# Patient Record
Sex: FEMALE | Race: WHITE | NOT HISPANIC OR LATINO | Employment: FULL TIME | URBAN - METROPOLITAN AREA
[De-identification: names, ages, dates, MRNs, and addresses within clinical notes are randomized per-mention and may not be internally consistent; named-entity substitution may affect disease eponyms.]

---

## 2020-01-10 ENCOUNTER — LAB (OUTPATIENT)
Dept: LAB | Facility: HOSPITAL | Age: 40
End: 2020-01-10

## 2020-01-10 ENCOUNTER — TRANSCRIBE ORDERS (OUTPATIENT)
Dept: LAB | Facility: HOSPITAL | Age: 40
End: 2020-01-10

## 2020-01-10 DIAGNOSIS — R63.5 ABNORMAL WEIGHT GAIN: Primary | ICD-10-CM

## 2020-01-10 DIAGNOSIS — Z01.419 ROUTINE GYNECOLOGICAL EXAMINATION: ICD-10-CM

## 2020-01-10 DIAGNOSIS — R63.5 ABNORMAL WEIGHT GAIN: ICD-10-CM

## 2020-01-10 LAB
ALBUMIN SERPL-MCNC: 4.5 G/DL (ref 3.5–5.2)
ALBUMIN/GLOB SERPL: 1.6 G/DL
ALP SERPL-CCNC: 60 U/L (ref 39–117)
ALT SERPL W P-5'-P-CCNC: 13 U/L (ref 1–33)
ANION GAP SERPL CALCULATED.3IONS-SCNC: 12.5 MMOL/L (ref 5–15)
AST SERPL-CCNC: 19 U/L (ref 1–32)
BILIRUB SERPL-MCNC: 0.5 MG/DL (ref 0.2–1.2)
BUN BLD-MCNC: 13 MG/DL (ref 6–20)
BUN/CREAT SERPL: 16.9 (ref 7–25)
CALCIUM SPEC-SCNC: 8.9 MG/DL (ref 8.6–10.5)
CHLORIDE SERPL-SCNC: 103 MMOL/L (ref 98–107)
CHOLEST SERPL-MCNC: 160 MG/DL (ref 0–200)
CO2 SERPL-SCNC: 23.5 MMOL/L (ref 22–29)
CREAT BLD-MCNC: 0.77 MG/DL (ref 0.57–1)
DEPRECATED RDW RBC AUTO: 39.7 FL (ref 37–54)
ERYTHROCYTE [DISTWIDTH] IN BLOOD BY AUTOMATED COUNT: 12.1 % (ref 12.3–15.4)
GFR SERPL CREATININE-BSD FRML MDRD: 83 ML/MIN/1.73
GLOBULIN UR ELPH-MCNC: 2.8 GM/DL
GLUCOSE BLD-MCNC: 107 MG/DL (ref 65–99)
HBA1C MFR BLD: 5.57 % (ref 4.8–5.6)
HCT VFR BLD AUTO: 40.4 % (ref 34–46.6)
HDLC SERPL-MCNC: 69 MG/DL (ref 40–60)
HGB BLD-MCNC: 13.4 G/DL (ref 12–15.9)
LDLC SERPL CALC-MCNC: 85 MG/DL (ref 0–100)
LDLC/HDLC SERPL: 1.23 {RATIO}
MCH RBC QN AUTO: 29.6 PG (ref 26.6–33)
MCHC RBC AUTO-ENTMCNC: 33.2 G/DL (ref 31.5–35.7)
MCV RBC AUTO: 89.4 FL (ref 79–97)
PLATELET # BLD AUTO: 326 10*3/MM3 (ref 140–450)
PMV BLD AUTO: 11.6 FL (ref 6–12)
POTASSIUM BLD-SCNC: 4.3 MMOL/L (ref 3.5–5.2)
PROT SERPL-MCNC: 7.3 G/DL (ref 6–8.5)
RBC # BLD AUTO: 4.52 10*6/MM3 (ref 3.77–5.28)
SODIUM BLD-SCNC: 139 MMOL/L (ref 136–145)
TRIGL SERPL-MCNC: 29 MG/DL (ref 0–150)
TSH SERPL DL<=0.05 MIU/L-ACNC: 1.28 UIU/ML (ref 0.27–4.2)
VLDLC SERPL-MCNC: 5.8 MG/DL (ref 5–40)
WBC NRBC COR # BLD: 4.54 10*3/MM3 (ref 3.4–10.8)

## 2020-01-10 PROCEDURE — 84443 ASSAY THYROID STIM HORMONE: CPT

## 2020-01-10 PROCEDURE — 80061 LIPID PANEL: CPT

## 2020-01-10 PROCEDURE — 36415 COLL VENOUS BLD VENIPUNCTURE: CPT

## 2020-01-10 PROCEDURE — 83036 HEMOGLOBIN GLYCOSYLATED A1C: CPT

## 2020-01-10 PROCEDURE — 85027 COMPLETE CBC AUTOMATED: CPT

## 2020-01-10 PROCEDURE — 80053 COMPREHEN METABOLIC PANEL: CPT

## 2020-01-10 PROCEDURE — 83525 ASSAY OF INSULIN: CPT

## 2020-01-14 LAB — INSULIN SERPL-ACNC: 5.6 UIU/ML

## 2022-02-24 ENCOUNTER — OFFICE VISIT (OUTPATIENT)
Dept: BARIATRICS/WEIGHT MGMT | Facility: CLINIC | Age: 42
End: 2022-02-24

## 2022-02-24 VITALS
TEMPERATURE: 98.2 F | HEIGHT: 64 IN | HEART RATE: 79 BPM | WEIGHT: 220.5 LBS | BODY MASS INDEX: 37.65 KG/M2 | DIASTOLIC BLOOD PRESSURE: 66 MMHG | RESPIRATION RATE: 18 BRPM | SYSTOLIC BLOOD PRESSURE: 122 MMHG

## 2022-02-24 DIAGNOSIS — R73.09 ELEVATED HEMOGLOBIN A1C: ICD-10-CM

## 2022-02-24 DIAGNOSIS — E66.9 OBESITY (BMI 30-39.9): Primary | ICD-10-CM

## 2022-02-24 PROBLEM — F50.81 BINGE EATING DISORDER: Status: ACTIVE | Noted: 2022-02-24

## 2022-02-24 PROBLEM — F50.819 BINGE EATING DISORDER: Status: ACTIVE | Noted: 2022-02-24

## 2022-02-24 PROCEDURE — 99205 OFFICE O/P NEW HI 60 MIN: CPT | Performed by: NURSE PRACTITIONER

## 2022-02-24 RX ORDER — MULTIPLE VITAMINS W/ MINERALS TAB 9MG-400MCG
1 TAB ORAL DAILY
COMMUNITY

## 2022-02-24 RX ORDER — LEVOCETIRIZINE DIHYDROCHLORIDE 5 MG/1
5 TABLET, FILM COATED ORAL EVERY EVENING
COMMUNITY

## 2022-02-24 NOTE — PROGRESS NOTES
[facility]  [address]  [place] [address]  [phone]    Name: Deanna Menon  : 1980  Patient Care Team:  Provider, No Known as PCP - General    Chief Complaint   Patient presents with   • Nutrition Counseling   • Consult   • Obesity        HPI:   Ms. Deanna Menon is a 41 y.o. female presents for initiation of medically supervised weight loss. She has tried other methods/programs to lose weight including:  Weight Watchers, Joining a gym and Food journaling and has previously used medication to assist weight loss including:.  Adipex (3 years ago)    Lifetime non-pregnant maximum weight: 220.5  Weight 1 year ago: 180  Weight 5 years ago: 150  The following have contributed to weight gain: over eating    The following seem to sabotage weight loss efforts:enjoyment of food, social events, mindless eating (snacking while working or watching TV), eating too fast, specific cravings like carbohydrates and convenience food (fast food)    Recent Weight History:   Wt Readings from Last 6 Encounters:   22 100 kg (220 lb 8 oz)       Review of Systems:   Review of Systems   Constitutional: Positive for fatigue.        Positive for weight gain   HENT: Negative for trouble swallowing.         Negative for throat swelling   Respiratory: Negative for shortness of breath and wheezing.         Negative for snoring   Cardiovascular: Negative for chest pain, palpitations and leg swelling.   Gastrointestinal: Negative for abdominal pain, constipation, diarrhea, GERD and indigestion.   Endocrine: Negative for cold intolerance, heat intolerance, polydipsia, polyphagia and polyuria.        Negative for loss of hair  Negative for hirsutism     Genitourinary:        Denies menstrual irregularities   Musculoskeletal: Negative for arthralgias.        Denies exercise limitations  Denies chronic pain   Skin: Negative for dry skin.        Negative for acne   Neurological:  "Negative for headache and memory problem.        Negative for paresthesias   Psychiatric/Behavioral: Negative for self-injury, sleep disturbance, suicidal ideas and depressed mood. The patient is not nervous/anxious.        No Known Allergies    Exercise:      Current FITT Score      Frequency   Intensity Time Strength Training   []   0 None  []   0 None  []   0 None  [x]   0 None    []   1 (1-2x/week) []   1 (light) []   1 (<10 min) []   1 (1x/week)   [x]   2 (3-5x/week) [x]   2 (moderate) []   2 (10-20 min) []   2 (2x/week)   []   3 (daily)   []   3 (moderately hard)  []   4 (very hard) [x]   3 (20-30 min)  []   4 (>30 min) []   3 (3-4x/week)       Water: 1 gallon per day oz/day  Alcohol: CAGE is negative   Other beverages:  coffee, sports drinks    PHQ-9 Total Score:  4    VS   Vitals:    02/24/22 0839   BP: 122/66   BP Location: Left arm   Patient Position: Sitting   Cuff Size: Adult   Pulse: 79   Resp: 18   Temp: 98.2 °F (36.8 °C)   TempSrc: Temporal   Weight: 100 kg (220 lb 8 oz)   Height: 162.6 cm (64\")       Start Weight/BMI: 220.5 lb  %fat: 46.2  Total body water (in lbs): 87.0    Measurements (in inches)  Neck: 14  Chest: 45  Waist: 44.4  Hips: 48  Thighs: 46    Physical Exam     ASSESSMENT/PLAN:   Patient viewed educational videos. Topics included obesity as a disease, nutritional education on food groups, exercise, and medications. Patient was instructed in adequate protein, controlled carb and controlled fat intake.   Patient received instructions on using the medicines as a tool in controlling their weight with nutritional and behavioral changes. Risks and benefits were discussed. I believe the potential benefits of medication helping to decrease weight outweighs the risks. Patient is to try nutritonal/behavioral changes only first.   Patient received our clinic education booklet.   Our patient consent form was reviewed including potential risks of weight loss. We also reviewed our confidentiality and " HIPPA statements.     Patient's past medical history was reviewed in detail and barriers to weight loss were identified and discussed. Past efforts at weight reduction on their own as well as under physician supervision were documented and discussed.  I advised patient to continue routine care with their Primary Care Provider.     Nutritional recommendations and goals were reviewed including Calories: 1200 daily adjusted for exercise calories burnt, Protein:35% daily, Net carbs (total carb - fiber) 30% daily.    Diagnoses and all orders for this visit:    1. Obesity (BMI 30-39.9) (Primary)  Assessment & Plan:  Patient's (Body mass index is 37.85 kg/m².) indicates that they are morbidly obese (BMI > 40 or > 35 with obesity - related health condition) with health conditions that include dyslipidemias and pre-diabetic . Weight is newly identified. BMI is is above average; BMI management plan is completed. We discussed low calorie, low carb based diet program, portion control and increasing exercise.   --This is patient's initial visit.  My fitness pal was set up in office based on basal metabolic rate.  She has had experiences tracking and does find it helpful when she does this.  Advised that her #1 goal over the next couple weeks is to restart tracking focusing mostly on her calorie count, protein grams and carb grams.  Asked her to bring in her food journal to next office visit for brief review.   --Fasting labs were completed in office today looking in particular at insulin resistance as she did have a elevated fasting glucose 2 years ago.  She has never been told that she has prediabetes but she currently does not have a primary care provider.  Referral for that sent in.  --Discussed pharmacology.  Patient is not 100% certain if she would prefer pharmacology as an option but we did discuss that the GLP-1 such as Wegovy could be a good option in particular if her labs show that she has a form of insulin resistance.   Prior approval request sent to team to get this started.  Prescription has not been sent in yet and we plan to discuss further at next office visit.    Orders:  -     Urine Drug Screen - Urine, Clean Catch  -     Comprehensive Metabolic Panel  -     Hemoglobin A1c  -     Insulin, Total  -     Lipid Panel  -     T4, Free  -     TSH  -     Vitamin D 25 Hydroxy  -     Ambulatory Referral to Family Practice    2. Elevated hemoglobin A1c  -     Urine Drug Screen - Urine, Clean Catch  -     Comprehensive Metabolic Panel  -     Hemoglobin A1c  -     Insulin, Total  -     Lipid Panel  -     T4, Free  -     TSH  -     Vitamin D 25 Hydroxy  -     Ambulatory Referral to Family Practice       Treatment Plan  Non-Weight Loss Goals: Improved blood glucose: has been addressed., Improved mobility: has been addressed.  and Improved energy: has been addressed.   Initial goal of 5-10% loss of beginning body weight  Goals:  1. Personal Goals: Weight of 160lbs  2. Start changing nutrition to examples given to meet nutritional macronutrient individual ranges discussed. Titrate down 500 calories every 5 days as tolerated until range met. Titrate down 50g carbohydrates every 5 days as tolerated until range met. Inc exercise to the individualized FITT score discussed. Start to keep a food journal and bring into next visit in 2 weeks for review. Practice the behavioral modification technique of mindful eating. Take one MVI daily and 2000mg fish oil daily. Take other medications and supplements as directed.    I spent 75 minutes on this date of service. This time includes time spent by me in the following activities:preparing for the visit, counseling and educating the patient/family/caregiver, ordering medications, tests, or procedures and documenting information in the medical record.    Return in about 2 weeks (around 3/10/2022).      JHONNY Lantigua

## 2022-02-24 NOTE — ASSESSMENT & PLAN NOTE
Patient's (Body mass index is 37.85 kg/m².) indicates that they are morbidly obese (BMI > 40 or > 35 with obesity - related health condition) with health conditions that include dyslipidemias and pre-diabetic . Weight is newly identified. BMI is is above average; BMI management plan is completed. We discussed low calorie, low carb based diet program, portion control and increasing exercise.   --This is patient's initial visit.  My fitness pal was set up in office based on basal metabolic rate.  She has had experiences tracking and does find it helpful when she does this.  Advised that her #1 goal over the next couple weeks is to restart tracking focusing mostly on her calorie count, protein grams and carb grams.  Asked her to bring in her food journal to next office visit for brief review.   --Fasting labs were completed in office today looking in particular at insulin resistance as she did have a elevated fasting glucose 2 years ago.  She has never been told that she has prediabetes but she currently does not have a primary care provider.  Referral for that sent in.  --Discussed pharmacology.  Patient is not 100% certain if she would prefer pharmacology as an option but we did discuss that the GLP-1 such as Wegovy could be a good option in particular if her labs show that she has a form of insulin resistance.  Prior approval request sent to team to get this started.  Prescription has not been sent in yet and we plan to discuss further at next office visit.

## 2022-02-25 ENCOUNTER — PATIENT ROUNDING (BHMG ONLY) (OUTPATIENT)
Dept: BARIATRICS/WEIGHT MGMT | Facility: CLINIC | Age: 42
End: 2022-02-25

## 2022-02-25 NOTE — PROGRESS NOTES
A [employer] message has been sent to the patient for PATIENT ROUNDING for [facility].

## 2022-02-26 LAB
25(OH)D3+25(OH)D2 SERPL-MCNC: 28.1 NG/ML (ref 30–100)
ALBUMIN SERPL-MCNC: 4.3 G/DL (ref 3.8–4.8)
ALBUMIN/GLOB SERPL: 1.9 {RATIO} (ref 1.2–2.2)
ALP SERPL-CCNC: 65 IU/L (ref 44–121)
ALT SERPL-CCNC: 14 IU/L (ref 0–32)
AMPHETAMINES UR QL SCN: NEGATIVE NG/ML
AST SERPL-CCNC: 15 IU/L (ref 0–40)
BARBITURATES UR QL SCN: NEGATIVE NG/ML
BENZODIAZ UR QL SCN: NEGATIVE NG/ML
BILIRUB SERPL-MCNC: 0.5 MG/DL (ref 0–1.2)
BUN SERPL-MCNC: 9 MG/DL (ref 6–24)
BUN/CREAT SERPL: 13 (ref 9–23)
BZE UR QL SCN: NEGATIVE NG/ML
CALCIUM SERPL-MCNC: 8.7 MG/DL (ref 8.7–10.2)
CANNABINOIDS UR QL SCN: NEGATIVE NG/ML
CHLORIDE SERPL-SCNC: 103 MMOL/L (ref 96–106)
CHOLEST SERPL-MCNC: 185 MG/DL (ref 100–199)
CO2 SERPL-SCNC: 23 MMOL/L (ref 20–29)
CREAT SERPL-MCNC: 0.68 MG/DL (ref 0.57–1)
CREAT UR-MCNC: 22.4 MG/DL (ref 20–300)
GLOBULIN SER CALC-MCNC: 2.3 G/DL (ref 1.5–4.5)
GLUCOSE SERPL-MCNC: 107 MG/DL (ref 65–99)
HBA1C MFR BLD: 5.5 % (ref 4.8–5.6)
HDLC SERPL-MCNC: 64 MG/DL
INSULIN SERPL-ACNC: 6.8 UIU/ML (ref 2.6–24.9)
LABORATORY COMMENT REPORT: NORMAL
LDLC SERPL CALC-MCNC: 107 MG/DL (ref 0–99)
METHADONE UR QL SCN: NEGATIVE NG/ML
OPIATES UR QL SCN: NEGATIVE NG/ML
OXYCODONE+OXYMORPHONE UR QL SCN: NEGATIVE NG/ML
PCP UR QL: NEGATIVE NG/ML
PH UR: 7.1 [PH] (ref 4.5–8.9)
POTASSIUM SERPL-SCNC: 4.6 MMOL/L (ref 3.5–5.2)
PROPOXYPH UR QL SCN: NEGATIVE NG/ML
PROT SERPL-MCNC: 6.6 G/DL (ref 6–8.5)
SODIUM SERPL-SCNC: 138 MMOL/L (ref 134–144)
T4 FREE SERPL-MCNC: 0.88 NG/DL (ref 0.82–1.77)
TRIGL SERPL-MCNC: 77 MG/DL (ref 0–149)
TSH SERPL DL<=0.005 MIU/L-ACNC: 1.22 UIU/ML (ref 0.45–4.5)
VLDLC SERPL CALC-MCNC: 14 MG/DL (ref 5–40)

## 2022-02-28 RX ORDER — ERGOCALCIFEROL 1.25 MG/1
50000 CAPSULE ORAL WEEKLY
Qty: 8 CAPSULE | Refills: 0 | Status: SHIPPED | OUTPATIENT
Start: 2022-02-28 | End: 2022-04-29

## 2022-03-09 RX ORDER — SEMAGLUTIDE 0.25 MG/.5ML
0.25 INJECTION, SOLUTION SUBCUTANEOUS WEEKLY
Qty: 2 ML | Refills: 0 | Status: CANCELLED | OUTPATIENT
Start: 2022-03-09 | End: 2022-04-08

## 2022-03-09 NOTE — PROGRESS NOTES
"[facility]  [address]  [place] [address]    Name: Deanna Menon  : 1980    /62 (BP Location: Right arm, Patient Position: Sitting, Cuff Size: Adult)   Pulse 83   Temp 98 °F (36.7 °C) (Temporal)   Resp 18   Ht 162.6 cm (64\")   Wt 94.6 kg (208 lb 8 oz)   SpO2 98%   BMI 35.79 kg/m²     Chief Complaint   Patient presents with   • Obesity   • Follow-up        No Known Allergies    Vitals:    03/10/22 0859   BP: 118/62   BP Location: Right arm   Patient Position: Sitting   Cuff Size: Adult   Pulse: 83   Resp: 18   Temp: 98 °F (36.7 °C)   TempSrc: Temporal   SpO2: 98%   Weight: 94.6 kg (208 lb 8 oz)   Height: 162.6 cm (64\")     Recent Weight History:   Wt Readings from Last 6 Encounters:   03/10/22 94.6 kg (208 lb 8 oz)   22 100 kg (220 lb 8 oz)       Last office visit weight (at [facility] visit) : 220.5  Change in weight since last visit: -12  Start Weight:220.5  Total Loss%: -5.44  Loss of BBW: -12    Body composition analysis completed and showed:  %body fat: 46.1  Total fat mass (in lbs): 96.0  Fat free mass (in lbs): 112.5  Total body water (in lb): 82.5  BMR: 1672     Measurements (in inches)  Waist: 37.5    The patient is exercising with a FITT score of:    Frequency   Intensity Time Strength Training   []   0 None  []   0 None  []   0 None  [x]   0 None    []   1 (1-2x/week) [x]   1 (light) []   1 (<10 min) []   1 (1x/week)   []   2 (3-5x/week) [x]   2 (moderate) []   2 (10-20 min) []   2 (2x/week)   []   3 (daily)   []   3 (moderately hard)  []   4 (very hard) []   3 (20-30 min)  [x]   4 (>30 min) []   3 (3-4x/week)       Office visit for Deanna Menon, a 41 y.o. female, established patient for medical weight management. Patient is satisfied with weight loss progress.    Hunger control has remained unchanged The patient is exercising. The patient is using a food journal. The patient is not checking weight regularly. The patient " rates current efforts as 10 out of 10. Body mass index is 35.79 kg/m². and has not reached treatment goal.     Review of Systems:  Review of Systems   Constitutional: Negative for fatigue.   Eyes: Negative for visual disturbance.   Cardiovascular: Negative for chest pain and palpitations.   Gastrointestinal: Negative for abdominal pain, constipation, diarrhea, nausea and GERD.   Neurological: Negative for dizziness, tremors, light-headedness, headache and memory problem.        Parasthesias negative   Psychiatric/Behavioral: Negative for sleep disturbance and depressed mood. The patient is not nervous/anxious.        Physical Exam:  Physical Exam  Vitals reviewed.   Constitutional:       Appearance: She is obese. She is not ill-appearing.   HENT:      Head:      Comments: masked  Pulmonary:      Effort: Pulmonary effort is normal.   Neurological:      Mental Status: She is alert.   Psychiatric:         Attention and Perception: Attention and perception normal.         Behavior: Behavior is cooperative.          ASSESSMENT/PLAN:   Diagnoses and all orders for this visit:    1. Obesity (BMI 30-39.9) (Primary)  Assessment & Plan:  Patient's (Body mass index is 35.79 kg/m².) indicates that they are morbidly obese (BMI > 40 or > 35 with obesity - related health condition) with health conditions that include dyslipidemias and pre-diabetes . Weight is improving with lifestyle modifications. BMI is is above average; BMI management plan is completed. We discussed low calorie, low carb based diet program, portion control and increasing exercise.     --This is the initial follow-up visit and she is down 12  pounds since last office visit  --She has done an excellent job of food journaling tracking every single day since last office visit and did bring in for a review.  She is staying within her calorie goals almost all days and keeping a very close track on her macros as well.  But this great effort and her #1 goal over this  next month is going to be to continue this and asked her to bring in her food journal to next office visit.  Again emphasized that were not looking for perfection but again just looking to see where her calories are coming from.  --Goal to add 3 non starchy vegetables a day   --Has reached water goal. Keep this great effort  --Been very good with exercise keeping 30 min daily.       2. Elevated hemoglobin A1c  Assessment & Plan:  --Start ozempic. PA started and Rx sent to pharmacy.  Patient advised if she is able to pick this up prior to next office visit to not start but instead bring it into office for injection instructions.  If not covered, consider Rybelsus.     Orders:  -     Semaglutide,0.25 or 0.5MG/DOS, (Ozempic, 0.25 or 0.5 MG/DOSE,) 2 MG/1.5ML solution pen-injector; Inject 0.25 mg under the skin into the appropriate area as directed 1 (One) Time Per Week for 30 days.  Dispense: 1 pen; Refill: 0     I spent 30 minutes on this date of service. This time includes time spent by me in the following activities:preparing for the visit, counseling and educating the patient/family/caregiver, ordering medications, tests, or procedures and documenting information in the medical record.    Plan of care reviewed with the patient at the conclusion of today's visit. We discussed the risks, benefits, and limitations of treatments. Continue medications and OTC supplements as discussed. Patient verbalizes understanding of and agreement with management plan.      Return in about 4 weeks (around 4/7/2022).      JHONNY Lantigua

## 2022-03-10 ENCOUNTER — OFFICE VISIT (OUTPATIENT)
Dept: BARIATRICS/WEIGHT MGMT | Facility: CLINIC | Age: 42
End: 2022-03-10

## 2022-03-10 VITALS
TEMPERATURE: 98 F | BODY MASS INDEX: 35.59 KG/M2 | RESPIRATION RATE: 18 BRPM | HEART RATE: 83 BPM | WEIGHT: 208.5 LBS | OXYGEN SATURATION: 98 % | SYSTOLIC BLOOD PRESSURE: 118 MMHG | HEIGHT: 64 IN | DIASTOLIC BLOOD PRESSURE: 62 MMHG

## 2022-03-10 DIAGNOSIS — E66.9 OBESITY (BMI 30-39.9): Primary | ICD-10-CM

## 2022-03-10 DIAGNOSIS — R73.09 ELEVATED HEMOGLOBIN A1C: ICD-10-CM

## 2022-03-10 PROCEDURE — 99214 OFFICE O/P EST MOD 30 MIN: CPT | Performed by: NURSE PRACTITIONER

## 2022-03-10 RX ORDER — SEMAGLUTIDE 1.34 MG/ML
0.25 INJECTION, SOLUTION SUBCUTANEOUS WEEKLY
Qty: 1 PEN | Refills: 0 | Status: SHIPPED | OUTPATIENT
Start: 2022-03-10 | End: 2022-04-09

## 2022-03-10 NOTE — ASSESSMENT & PLAN NOTE
--Start ozempic. PA started and Rx sent to pharmacy.  Patient advised if she is able to pick this up prior to next office visit to not start but instead bring it into office for injection instructions.  If not covered, consider Rybelsus.

## 2022-03-10 NOTE — ASSESSMENT & PLAN NOTE
Patient's (Body mass index is 35.79 kg/m².) indicates that they are morbidly obese (BMI > 40 or > 35 with obesity - related health condition) with health conditions that include dyslipidemias and pre-diabetes . Weight is improving with lifestyle modifications. BMI is is above average; BMI management plan is completed. We discussed low calorie, low carb based diet program, portion control and increasing exercise.     --This is the initial follow-up visit and she is down 12  pounds since last office visit  --She has done an excellent job of food journaling tracking every single day since last office visit and did bring in for a review.  She is staying within her calorie goals almost all days and keeping a very close track on her macros as well.  But this great effort and her #1 goal over this next month is going to be to continue this and asked her to bring in her food journal to next office visit.  Again emphasized that were not looking for perfection but again just looking to see where her calories are coming from.  --Goal to add 3 non starchy vegetables a day   --Has reached water goal. Keep this great effort  --Been very good with exercise keeping 30 min daily.

## 2022-04-11 NOTE — PROGRESS NOTES
"[facility] [place] [facility]  [address]  [place] [address]    Name: Deanna Menon  : 1980    /68 (BP Location: Right arm, Patient Position: Sitting, Cuff Size: Adult)   Pulse 72   Temp 97.5 °F (36.4 °C) (Temporal)   Resp 18   Ht 162.6 cm (64\")   Wt 89.4 kg (197 lb)   SpO2 98%   BMI 33.81 kg/m²     Chief Complaint   Patient presents with   • Obesity   • Follow-up        No Known Allergies    Vitals:    22 0813   BP: 126/68   BP Location: Right arm   Patient Position: Sitting   Cuff Size: Adult   Pulse: 72   Resp: 18   Temp: 97.5 °F (36.4 °C)   TempSrc: Temporal   SpO2: 98%   Weight: 89.4 kg (197 lb)   Height: 162.6 cm (64\")       Recent Weight History:   Wt Readings from Last 6 Encounters:   22 89.4 kg (197 lb)   03/10/22 94.6 kg (208 lb 8 oz)   22 100 kg (220 lb 8 oz)       Last office visit weight (at [facility] visit) : 208.5  Change in weight since last visit: -11.5  Start Weight: 220.5  Total Loss%: -10.66  Loss of BBW: -23.5    Body composition analysis completed and showed:  %body fat: 44.2  Total fat mass (in lbs): 87  Fat free mass (in lbs): 110  Total body water (in lb): 80.5  BMR: 1622     Measurements (in inches)  Waist: 37    The patient is exercising with a FITT score of:    Frequency   Intensity Time Strength Training   []   0 None  []   0 None  []   0 None  [x]   0 None    []   1 (1-2x/week) [x]   1 (light) []   1 (<10 min) []   1 (1x/week)   [x]   2 (3-5x/week) [x]   2 (moderate) []   2 (10-20 min) []   2 (2x/week)   []   3 (daily)   []   3 (moderately hard)  []   4 (very hard) []   3 (20-30 min)  [x]   4 (>30 min) []   3 (3-4x/week)       Office visit for Deanna Menon, a 41 y.o. female, established patient for medical weight management. Patient is satisfied with weight loss progress.    Hunger control has remained unchanged The patient is exercising. The patient is using a food journal. The patient is not checking " weight regularly. The patient rates current efforts as 10 out of 10. Body mass index is 33.81 kg/m². and has not reached treatment goal.     Review of Systems:  Review of Systems   Constitutional: Negative for fatigue.   Eyes: Negative for visual disturbance.   Cardiovascular: Negative for chest pain and palpitations.   Gastrointestinal: Negative for abdominal pain, constipation, diarrhea, nausea and GERD.   Neurological: Negative for dizziness, tremors, light-headedness, headache and memory problem.        Parasthesias negative   Psychiatric/Behavioral: Negative for sleep disturbance and depressed mood. The patient is not nervous/anxious.        Physical Exam:  Physical Exam  Vitals reviewed.   Constitutional:       Appearance: She is obese. She is not ill-appearing.   HENT:      Head:      Comments: masked  Pulmonary:      Effort: Pulmonary effort is normal.   Neurological:      Mental Status: She is alert.   Psychiatric:         Attention and Perception: Attention and perception normal.         Behavior: Behavior is cooperative.          ASSESSMENT/PLAN:   Diagnoses and all orders for this visit:    1. Obesity (BMI 30-39.9) (Primary)  Assessment & Plan:  Patient's (Body mass index is 33.81 kg/m².) indicates that they are obese (BMI >30) with health conditions that include dyslipidemias and pre-diabetic . Weight is improving with lifestyle modifications. BMI is is above average; BMI management plan is completed. We discussed low calorie, low carb based diet program, portion control and increasing exercise.     --This is a follow up visit and patient is down 11.5 pounds since last office visit.  --She did bring in her food journal and has done an excellent job of food journaling every day this past month.  In particular she has stayed within her macro goals.  Advised to continue this great effort and to bring in to food journal to next office visit.  --She did bring the Ozempic into office today and we discussed  all questions.  She is still deciding if she plans to start this medication but she was given injection instructions.  Zofran sent in to use as needed for nausea if she does decide to start at home.  --Does regular exercise. Keep up great effort.   --Goal of eating 3 non-starchy vegetables. Still working on this habit.       2. Elevated hemoglobin A1c    Other orders  -     ondansetron ODT (ZOFRAN-ODT) 8 MG disintegrating tablet; Place 1 tablet on the tongue Every 8 (Eight) Hours As Needed for Nausea or Vomiting for up to 30 days.  Dispense: 30 tablet; Refill: 1       I spent 40 minutes on this date of service. This time includes time spent by me in the following activities:preparing for the visit, counseling and educating the patient/family/caregiver, ordering medications, tests, or procedures and documenting information in the medical record.    Plan of care reviewed with the patient at the conclusion of today's visit. We discussed the risks, benefits, and limitations of treatments. Continue medications and OTC supplements as discussed. Patient verbalizes understanding of and agreement with management plan.      Return in about 4 weeks (around 5/11/2022).      Aminah Rosario, APRN

## 2022-04-13 ENCOUNTER — OFFICE VISIT (OUTPATIENT)
Dept: BARIATRICS/WEIGHT MGMT | Facility: CLINIC | Age: 42
End: 2022-04-13

## 2022-04-13 VITALS
OXYGEN SATURATION: 98 % | DIASTOLIC BLOOD PRESSURE: 68 MMHG | SYSTOLIC BLOOD PRESSURE: 126 MMHG | TEMPERATURE: 97.5 F | BODY MASS INDEX: 33.63 KG/M2 | HEIGHT: 64 IN | WEIGHT: 197 LBS | RESPIRATION RATE: 18 BRPM | HEART RATE: 72 BPM

## 2022-04-13 DIAGNOSIS — R73.09 ELEVATED HEMOGLOBIN A1C: ICD-10-CM

## 2022-04-13 DIAGNOSIS — E66.9 OBESITY (BMI 30-39.9): Primary | ICD-10-CM

## 2022-04-13 PROCEDURE — 99215 OFFICE O/P EST HI 40 MIN: CPT | Performed by: NURSE PRACTITIONER

## 2022-04-13 RX ORDER — ONDANSETRON 8 MG/1
8 TABLET, ORALLY DISINTEGRATING ORAL EVERY 8 HOURS PRN
Qty: 30 TABLET | Refills: 1 | Status: SHIPPED | OUTPATIENT
Start: 2022-04-13 | End: 2022-05-13

## 2022-04-13 NOTE — ASSESSMENT & PLAN NOTE
Patient's (Body mass index is 33.81 kg/m².) indicates that they are obese (BMI >30) with health conditions that include dyslipidemias and pre-diabetic . Weight is improving with lifestyle modifications. BMI is is above average; BMI management plan is completed. We discussed low calorie, low carb based diet program, portion control and increasing exercise.     --This is a follow up visit and patient is down 11.5 pounds since last office visit.  --She did bring in her food journal and has done an excellent job of food journaling every day this past month.  In particular she has stayed within her macro goals.  Advised to continue this great effort and to bring in to food journal to next office visit.  --She did bring the Ozempic into office today and we discussed all questions.  She is still deciding if she plans to start this medication but she was given injection instructions.  Zofran sent in to use as needed for nausea if she does decide to start at home.  --Does regular exercise. Keep up great effort.   --Goal of eating 3 non-starchy vegetables. Still working on this habit.

## 2022-05-11 NOTE — PROGRESS NOTES
"[facility] [place] [facility]  [address]  [place] [address]    Name: Deanna Menon  : 1980    /64 (BP Location: Right arm, Patient Position: Sitting, Cuff Size: Adult)   Pulse 82   Temp 96.2 °F (35.7 °C) (Temporal)   Resp 18   Ht 162.6 cm (64\")   Wt 85.5 kg (188 lb 8 oz)   SpO2 98%   BMI 32.36 kg/m²     Chief Complaint   Patient presents with   • Obesity   • Follow-up        No Known Allergies    Vitals:    22 0828   BP: 122/64   BP Location: Right arm   Patient Position: Sitting   Cuff Size: Adult   Pulse: 82   Resp: 18   Temp: 96.2 °F (35.7 °C)   TempSrc: Temporal   SpO2: 98%   Weight: 85.5 kg (188 lb 8 oz)   Height: 162.6 cm (64\")       Recent Weight History:   Wt Readings from Last 6 Encounters:   22 85.5 kg (188 lb 8 oz)   22 89.4 kg (197 lb)   03/10/22 94.6 kg (208 lb 8 oz)   22 100 kg (220 lb 8 oz)       Last office visit weight (at [facility] visit) : 197  Change in weight since last visit:-8.5  Start Weight: 220.5  Total Loss%: -14.51  Loss of BBW: -32    Body composition analysis completed and showed:  %body fat: 43.1  Total fat mass (in lbs): 81  Fat free mass (in lbs): 107.5  Total body water (in lb): 78.5  BMR: 1585     Measurements (in inches)  Waist: 34    The patient is exercising with a FITT score of:    Frequency   Intensity Time Strength Training   []   0 None  []   0 None  []   0 None  [x]   0 None    []   1 (1-2x/week) [x]   1 (light) []   1 (<10 min) []   1 (1x/week)   [x]   2 (3-5x/week) [x]   2 (moderate) []   2 (10-20 min) []   2 (2x/week)   []   3 (daily)   []   3 (moderately hard)  []   4 (very hard) []   3 (20-30 min)  [x]   4 (>30 min) []   3 (3-4x/week)       Office visit for Deanna Menon, a 41 y.o. female, established patient for medical weight management. Patient is satisfied with weight loss progress.    Hunger control has improved The patient is exercising. The patient is using a food journal. The " patient is not checking weight regularly. The patient rates current efforts as 10 out of 10. Body mass index is 32.36 kg/m². and has not reached treatment goal.     Review of Systems:  Review of Systems   Constitutional: Negative for fatigue.   Eyes: Negative for visual disturbance.   Cardiovascular: Negative for chest pain and palpitations.   Gastrointestinal: Negative for abdominal pain, constipation, diarrhea, nausea and GERD.   Neurological: Negative for dizziness, tremors, light-headedness, headache and memory problem.        Parasthesias negative   Psychiatric/Behavioral: Negative for sleep disturbance and depressed mood. The patient is not nervous/anxious.        Physical Exam:  Physical Exam  Vitals reviewed.   Constitutional:       Appearance: She is obese. She is not ill-appearing.   HENT:      Head:      Comments: masked  Pulmonary:      Effort: Pulmonary effort is normal.   Neurological:      Mental Status: She is alert.   Psychiatric:         Attention and Perception: Attention and perception normal.         Behavior: Behavior is cooperative.          ASSESSMENT/PLAN:   Diagnoses and all orders for this visit:    1. Obesity (BMI 30-39.9) (Primary)  Assessment & Plan:  Patient's (Body mass index is 32.36 kg/m².) indicates that they are obese (BMI >30) with health conditions that include dyslipidemias and pre-diabetic . Weight is improving with treatment. BMI is is above average; BMI management plan is completed. We discussed low calorie, low carb based diet program, portion control and increasing exercise.     -- Patient is here for a follow-up visit and she is down 8.5 pounds since last office visit.  -- She states that she is continuing to do her food journal and is journaling most days getting into a regular rhythm of food. She did bring in her   -- She did start the Ozempic around 3 weeks ago and has tolerated well with very minimal side effects with only very mild nausea not needing any medication  for this.  She just noticed slight constipation and started MiraLAX.  Advised to continue this on a regular basis as a constipation prevention.  Advised that MiraLAX does not work well after you are already constipated but works best for prevention.  -- Discussed it is okay to increase Ozempic to 0.25 after 4 full weeks at 0.25.  Prescription sent in.    Orders:  -     Semaglutide,0.25 or 0.5MG/DOS, (OZEMPIC) 2 MG/1.5ML solution pen-injector; Inject 0.5 mg under the skin into the appropriate area as directed 1 (One) Time Per Week for 30 days.  Dispense: 1 pen; Refill: 1    2. Elevated hemoglobin A1c  -     Semaglutide,0.25 or 0.5MG/DOS, (OZEMPIC) 2 MG/1.5ML solution pen-injector; Inject 0.5 mg under the skin into the appropriate area as directed 1 (One) Time Per Week for 30 days.  Dispense: 1 pen; Refill: 1       I spent 30 minutes on this date of service. This time includes time spent by me in the following activities:preparing for the visit, counseling and educating the patient/family/caregiver, ordering medications, tests, or procedures and documenting information in the medical record.    Plan of care reviewed with the patient at the conclusion of today's visit. We discussed the risks, benefits, and limitations of treatments. Continue medications and OTC supplements as discussed. Patient verbalizes understanding of and agreement with management plan.      Return in about 4 weeks (around 6/9/2022).      JHONNY Lantigua

## 2022-05-12 ENCOUNTER — OFFICE VISIT (OUTPATIENT)
Dept: BARIATRICS/WEIGHT MGMT | Facility: CLINIC | Age: 42
End: 2022-05-12

## 2022-05-12 VITALS
SYSTOLIC BLOOD PRESSURE: 122 MMHG | OXYGEN SATURATION: 98 % | DIASTOLIC BLOOD PRESSURE: 64 MMHG | BODY MASS INDEX: 32.18 KG/M2 | HEIGHT: 64 IN | RESPIRATION RATE: 18 BRPM | WEIGHT: 188.5 LBS | TEMPERATURE: 96.2 F | HEART RATE: 82 BPM

## 2022-05-12 DIAGNOSIS — E66.9 OBESITY (BMI 30-39.9): Primary | ICD-10-CM

## 2022-05-12 DIAGNOSIS — R73.09 ELEVATED HEMOGLOBIN A1C: ICD-10-CM

## 2022-05-12 PROCEDURE — 99214 OFFICE O/P EST MOD 30 MIN: CPT | Performed by: NURSE PRACTITIONER

## 2022-05-12 NOTE — ASSESSMENT & PLAN NOTE
Patient's (Body mass index is 32.36 kg/m².) indicates that they are obese (BMI >30) with health conditions that include dyslipidemias and pre-diabetic . Weight is improving with treatment. BMI is is above average; BMI management plan is completed. We discussed low calorie, low carb based diet program, portion control and increasing exercise.     -- Patient is here for a follow-up visit and she is down 8.5 pounds since last office visit.  -- She states that she is continuing to do her food journal and is journaling most days getting into a regular rhythm of food. She did bring in her   -- She did start the Ozempic around 3 weeks ago and has tolerated well with very minimal side effects with only very mild nausea not needing any medication for this.  She just noticed slight constipation and started MiraLAX.  Advised to continue this on a regular basis as a constipation prevention.  Advised that MiraLAX does not work well after you are already constipated but works best for prevention.  -- Discussed it is okay to increase Ozempic to 0.25 after 4 full weeks at 0.25.  Prescription sent in.

## 2022-07-07 ENCOUNTER — TELEPHONE (OUTPATIENT)
Dept: BARIATRICS/WEIGHT MGMT | Facility: CLINIC | Age: 42
End: 2022-07-07

## 2022-07-07 ENCOUNTER — OFFICE VISIT (OUTPATIENT)
Dept: BARIATRICS/WEIGHT MGMT | Facility: CLINIC | Age: 42
End: 2022-07-07

## 2022-07-07 VITALS
WEIGHT: 173 LBS | OXYGEN SATURATION: 98 % | SYSTOLIC BLOOD PRESSURE: 110 MMHG | HEART RATE: 71 BPM | DIASTOLIC BLOOD PRESSURE: 78 MMHG | TEMPERATURE: 96.9 F | RESPIRATION RATE: 18 BRPM | BODY MASS INDEX: 29.7 KG/M2

## 2022-07-07 DIAGNOSIS — E66.3 OVERWEIGHT (BMI 25.0-29.9): Primary | ICD-10-CM

## 2022-07-07 DIAGNOSIS — R73.09 ELEVATED HEMOGLOBIN A1C: ICD-10-CM

## 2022-07-07 DIAGNOSIS — E66.3 OVERWEIGHT (BMI 25.0-29.9): ICD-10-CM

## 2022-07-07 PROBLEM — Z86.39 HISTORY OF OBESITY: Status: ACTIVE | Noted: 2022-07-07

## 2022-07-07 PROCEDURE — 99215 OFFICE O/P EST HI 40 MIN: CPT | Performed by: NURSE PRACTITIONER

## 2022-07-07 NOTE — ASSESSMENT & PLAN NOTE
Patient's (Body mass index is 29.7 kg/m².) indicates that they are overweight with health conditions that include dyslipidemias and pre-diabetic . Weight is improving with treatment. BMI is is above average; BMI management plan is completed. We discussed low calorie, low carb based diet program, portion control and increasing exercise.   -- This is a follow-up visit and patient is down 15 pounds since last seen around 7 weeks ago  -- Currently taking Ozempic 0.5 held at this dose and tolerating well.  Using MiraLAX regularly as a preventative for constipation and denies other side effects.  -- Going over body comp analysis and adjusting calorie/macro goals as appropriate.  -- At next office visit we will look at an A1c.

## 2022-07-07 NOTE — TELEPHONE ENCOUNTER
[facility] in [place] called to get an adjustment on the prescription of Ozempic Pen.    Insurance requires a 90 day supply, Pharmacist asked could you resubmit a prescription to them for 3 pens with 0 refills     Pharmacy callback # [phone]

## 2022-07-07 NOTE — PROGRESS NOTES
[facility] [place] [facility]  [address]  [place] [address]    Name: Deanna Menon  : 1980    /78   Pulse 71   Temp 96.9 °F (36.1 °C)   Resp 18   Wt 78.5 kg (173 lb)   SpO2 98%   BMI 29.70 kg/m²     No chief complaint on file.       No Known Allergies    Vitals:    22 0832   BP: 110/78   Pulse: 71   Resp: 18   Temp: 96.9 °F (36.1 °C)   SpO2: 98%   Weight: 78.5 kg (173 lb)       Recent Weight History:   Wt Readings from Last 6 Encounters:   22 78.5 kg (173 lb)   22 85.5 kg (188 lb 8 oz)   22 89.4 kg (197 lb)   03/10/22 94.6 kg (208 lb 8 oz)   22 100 kg (220 lb 8 oz)       Last office visit weight (at [facility] visit) : 188.5  Change in weight since last visit: -15  Start Weight: 220.5  Loss of BBW: 47.5    Body composition analysis completed and showed:  %body fat: 39.5    Measurements (in inches)  Waist: 37in    Office visit for Deanna Menon, a 41 y.o. female, established patient for medical weight management. Patient is satisfied with weight loss progress.    Hunger control has improved The patient is exercising. The patient is using a food journal. The patient is checking weight regularly. The patient rates current efforts as 9 out of 10. Body mass index is 29.7 kg/m². and has not reached treatment goal.     Review of Systems:  Review of Systems   Constitutional: Negative for fatigue.   Eyes: Negative for visual disturbance.   Cardiovascular: Negative for chest pain and palpitations.   Gastrointestinal: Negative for abdominal pain, constipation, diarrhea, nausea and GERD.   Neurological: Negative for dizziness, tremors, light-headedness, headache and memory problem.        Parasthesias negative   Psychiatric/Behavioral: Negative for sleep disturbance and depressed mood. The patient is not nervous/anxious.        Physical Exam:  Physical Exam  Vitals reviewed.   Constitutional:       Appearance: She is well-developed.       Comments: overweight   HENT:      Head:      Comments: masked  Neck:      Thyroid: No thyroid mass or thyroid tenderness.   Pulmonary:      Effort: Pulmonary effort is normal.   Neurological:      Mental Status: She is alert.   Psychiatric:         Attention and Perception: Attention normal.         Mood and Affect: Mood normal.         Speech: Speech normal.         Behavior: Behavior normal.          Diagnoses and all orders for this visit:    1. Overweight (BMI 25.0-29.9) (Primary)  Assessment & Plan:  Patient's (Body mass index is 29.7 kg/m².) indicates that they are overweight with health conditions that include dyslipidemias and pre-diabetic . Weight is improving with treatment. BMI is is above average; BMI management plan is completed. We discussed low calorie, low carb based diet program, portion control and increasing exercise.   -- This is a follow-up visit and patient is down 15 pounds since last seen around 7 weeks ago  -- Currently taking Ozempic 0.5 held at this dose and tolerating well.  Using MiraLAX regularly as a preventative for constipation and denies other side effects.  -- Going over body comp analysis and adjusting calorie/macro goals as appropriate.  -- At next office visit we will look at an A1c.    Orders:  -     Semaglutide,0.25 or 0.5MG/DOS, (OZEMPIC) 2 MG/1.5ML solution pen-injector; Inject 0.5 mg under the skin into the appropriate area as directed 1 (One) Time Per Week for 30 days.  Dispense: 1 pen; Refill: 1    2. Elevated hemoglobin A1c  -     Semaglutide,0.25 or 0.5MG/DOS, (OZEMPIC) 2 MG/1.5ML solution pen-injector; Inject 0.5 mg under the skin into the appropriate area as directed 1 (One) Time Per Week for 30 days.  Dispense: 1 pen; Refill: 1     I spent 40 minutes on this date of service. This time includes time spent by me in the following activities:preparing for the visit, counseling and educating the patient/family/caregiver, ordering medications, tests, or procedures and  documenting information in the medical record.    Plan of care reviewed with the patient at the conclusion of today's visit. We discussed the risks, benefits, and limitations of treatments. Continue medications and OTC supplements as discussed. Patient verbalizes understanding of and agreement with management plan.      Return in about 4 weeks (around 8/4/2022).      JHONNY Lantigua

## 2022-08-07 NOTE — PROGRESS NOTES
"[facility] [place] [facility]  [address]  [place] [address]    Name: Deanna Menon  : 1980    /80   Pulse 79   Ht 162.6 cm (64\")   Wt 76 kg (167 lb 8 oz)   LMP 2022   SpO2 99%   BMI 28.75 kg/m²     Chief Complaint   Patient presents with   • Follow-up        No Known Allergies    Vitals:    22 08   BP: 124/80   Pulse: 79   SpO2: 99%   Weight: 76 kg (167 lb 8 oz)   Height: 162.6 cm (64\")       Recent Weight History:   Wt Readings from Last 6 Encounters:   22 76 kg (167 lb 8 oz)   22 78.5 kg (173 lb)   22 85.5 kg (188 lb 8 oz)   22 89.4 kg (197 lb)   03/10/22 94.6 kg (208 lb 8 oz)   22 100 kg (220 lb 8 oz)     The patient is exercising with a FITT score of:    Frequency   Intensity Time Strength Training   []   0 None  []   0 None  []   0 None  [x]   0 None    []   1 (1-2x/week) [x]   1 (light) []   1 (<10 min) []   1 (1x/week)   [x]   2 (3-5x/week) []   2 (moderate) []   2 (10-20 min) []   2 (2x/week)   []   3 (daily)   []   3 (moderately hard)  []   4 (very hard) []   3 (20-30 min)  [x]   4 (>30 min) []   3 (3-4x/week)       Office visit for Deanna Menon, a 41 y.o. female, established patient for medical weight management. Patient is satisfied with weight loss progress.    Hunger control has remained unchanged The patient is exercising. The patient is using a food journal. The patient is not checking weight regularly. The patient rates current efforts as 10 out of 10. Body mass index is 28.75 kg/m². and has not reached treatment goal.     Review of Systems:  Review of Systems   Constitutional: Negative for appetite change and fatigue.   Eyes: Negative for blurred vision and visual disturbance.   Cardiovascular: Negative for chest pain and palpitations.   Gastrointestinal: Negative for abdominal pain, constipation, diarrhea, nausea and GERD.   Endocrine: Negative for polydipsia, polyphagia and polyuria. "   Musculoskeletal: Negative for arthralgias and myalgias.   Neurological: Negative for dizziness, tremors, light-headedness, headache and memory problem.        Parasthesias negative   Psychiatric/Behavioral: Negative for sleep disturbance and depressed mood. The patient is not nervous/anxious.        Physical Exam:  Physical Exam  Vitals reviewed.   Constitutional:       Appearance: She is well-developed.      Comments: overweight   HENT:      Head:      Comments: masked  Neck:      Thyroid: No thyroid mass, thyromegaly or thyroid tenderness.   Pulmonary:      Effort: Pulmonary effort is normal.   Neurological:      Mental Status: She is alert.   Psychiatric:         Attention and Perception: Attention normal.         Mood and Affect: Mood normal.         Speech: Speech normal.         Behavior: Behavior normal.        I spent 30 minutes on this date of service. This time includes time spent by me in the following activities:preparing for the visit, counseling and educating the patient/family/caregiver, ordering medications, tests, or procedures and documenting information in the medical record.    Plan of care reviewed with the patient at the conclusion of today's visit. We discussed the risks, benefits, and limitations of treatments. Continue medications and OTC supplements as discussed. Patient verbalizes understanding of and agreement with management plan.      Return in about 4 weeks (around 9/6/2022).      JHONNY Lantigua

## 2022-08-09 ENCOUNTER — OFFICE VISIT (OUTPATIENT)
Dept: BARIATRICS/WEIGHT MGMT | Facility: CLINIC | Age: 42
End: 2022-08-09

## 2022-08-09 VITALS
OXYGEN SATURATION: 99 % | WEIGHT: 167.5 LBS | HEIGHT: 64 IN | DIASTOLIC BLOOD PRESSURE: 80 MMHG | SYSTOLIC BLOOD PRESSURE: 124 MMHG | BODY MASS INDEX: 28.6 KG/M2 | HEART RATE: 79 BPM

## 2022-08-09 DIAGNOSIS — E66.3 OVERWEIGHT (BMI 25.0-29.9): Primary | ICD-10-CM

## 2022-08-09 DIAGNOSIS — R73.09 ELEVATED HEMOGLOBIN A1C: ICD-10-CM

## 2022-08-09 LAB — HBA1C MFR BLD: 5.2 % (ref 4.8–5.6)

## 2022-08-09 PROCEDURE — 99215 OFFICE O/P EST HI 40 MIN: CPT | Performed by: NURSE PRACTITIONER

## 2022-08-09 NOTE — ASSESSMENT & PLAN NOTE
Patient's (Body mass index is 28.75 kg/m².) indicates that they are overweight with health conditions that include dyslipidemias and pre-diabetic  . Weight is improving with treatment. BMI is is above average; BMI management plan is completed. We discussed low calorie, low carb based diet program, portion control and an kirti-based approach such as [employer] Pal or Lose It.     --This is a follow up visit and she is down 5.5 lb since last visit  --She is doing an excellent job with food journaling and did bring in for a brief review.  Keep up this great effort.  --She is down a total of 53 pounds since the start 24%.  --Goal to increase vegetables this month. Has been increasing fresh non-processed fruits.   --Continue Ozempic 0.5 (held at this dose)   --A1C drawn today.

## 2022-08-16 NOTE — PROGRESS NOTES
[facility]  [address]  [place] [address]     Office Note      Date: 2022  Patient Name: Deanna Menon  MRN: [id number]  : 1980  Subjective  Subjective     Chief Complaint  Obesity Management follow-up          Deanna Menon presents to [facility] for obesity management.   Patient is satisfied with weight loss progress. Appetite is well controlled.   The patient is exercising with a FITT score of:    Frequency Intensity Time Strength Training   []   0, none []   0 []   0 [x]   0   [x]   1 (1-2x/week) [x]   1 (light) []   1 (<10 min) []   1 (1x/week)   []   2 (3-5x/week) []   2 (moderate) []   2 (10-20 min) []   2 (2x/week)   []   3 (daily) []   3 (moderately hard)  []   4 (very hard) []   3 (20-30 min)  [x]   4 (>30 min) []   3 (3-4x/week)     Review of Systems   Constitutional: Negative for appetite change and fatigue.   Eyes: Negative for blurred vision and visual disturbance.   Cardiovascular: Negative for chest pain and palpitations.   Gastrointestinal: Negative for abdominal pain, constipation, diarrhea, nausea and GERD.   Endocrine: Negative for polydipsia, polyphagia and polyuria.   Musculoskeletal: Negative for arthralgias and myalgias.   Neurological: Negative for dizziness, tremors, light-headedness, headache and memory problem.        Parasthesias negative   Psychiatric/Behavioral: Negative for sleep disturbance and depressed mood. The patient is not nervous/anxious.        Objective   Last office visit weight (at [facility] visit) : 173  Change in weight since last visit: -5.5  Start Weight: 220.5  Total Loss%: -24.4  Loss of BBW: -53    Recent Weight History:   Wt Readings from Last 6 Encounters:   22 76 kg (167 lb 8 oz)   22 78.5 kg (173 lb)   22 85.5 kg (188 lb 8 oz)   22 89.4 kg (197 lb)   03/10/22 94.6 kg (208 lb 8 oz)   22 100 kg (220 lb 8 oz)       Body mass index is 28.75  "kg/m².     Body composition analysis completed and showed:  %body fat: 64.1    Total fat mass (in lbs): 38.2  Fat free mass (in lbs): 64.01  Total body water (in lb): 76.01  BMR: 6228     Measurements (in inches)  Waist: 34.25    Vital Signs:   /80   Pulse 79   Ht 162.6 cm (64\")   Wt 76 kg (167 lb 8 oz)   SpO2 99%   BMI 28.75 kg/m²     Physical Exam  Vitals reviewed.   Constitutional:       Appearance: She is obese. She is not ill-appearing.   HENT:      Head:      Comments: masked  Pulmonary:      Effort: Pulmonary effort is normal.   Neurological:      Mental Status: She is alert.   Psychiatric:         Attention and Perception: Attention and perception normal.         Behavior: Behavior is cooperative.               Assessment / Plan        Diagnoses and all orders for this visit:    1. Overweight (BMI 25.0-29.9) (Primary)  Assessment & Plan:  Patient's (Body mass index is 28.75 kg/m².) indicates that they are overweight with health conditions that include dyslipidemias and pre-diabetic  . Weight is improving with treatment. BMI is is above average; BMI management plan is completed. We discussed low calorie, low carb based diet program, portion control and an kirti-based approach such as [employer] Pal or Lose It.     --This is a follow up visit and she is down 5.5 lb since last visit  --She is doing an excellent job with food journaling and did bring in for a brief review.  Keep up this great effort.  --She is down a total of 53 pounds since the start 24%.  --Goal to increase vegetables this month. Has been increasing fresh non-processed fruits.   --Continue Ozempic 0.5 (held at this dose)   --A1C drawn today.     Orders:  -     Hemoglobin A1c    2. Elevated hemoglobin A1c  Assessment & Plan:  Controlled on ozempic 0.5mg. A1C today.     Orders:  -     Hemoglobin A1c    We discussed the risks, benefits, and limitations of treatments. Continue medications and OTC supplements as discussed. Patient " verbalizes understanding of and agreement with management plan.     Follow Up   Return in about 4 weeks (around 9/6/2022).  Patient was given instructions and counseling regarding her condition or for health maintenance advice. Please see specific information pulled into the AVS if appropriate.     I spent 40 minutes on this date of service. This time includes time spent by me in the following activities:preparing for the visit, counseling and educating the patient/family/caregiver, ordering medications, tests, or procedures and documenting information in the medical record.    Aminah Rosario, JHONNY  08/09/2022

## 2022-09-12 NOTE — PROGRESS NOTES
"  [facility]  [address]  [place] [address]     Office Note      Date: 2022  Patient Name: Deanna Menon  MRN: [id number]  : 1980  Subjective  Subjective   --A1C  With in normal limits 5.2  --ozempic held at 0.5    Chief Complaint  Obesity Management follow-up     {Problem List  Visit Diagnosis   Encounters  Notes  Medications  Labs  Result Review Imaging  Media :23}     Deanna Menon presents to [facility] for obesity management.   Patient is {Satisfied/unsatisfied/unsure:53999} with weight loss progress. Appetite is {poor/mod/well:43394}. Reports no side effects of prescribed medications today. The patient {is / IS NOT:08920::\"is not\"} taking multivitamin and {is / IS NOT:87746::\"is not\"} taking fish oil.  The patient {is / IS NOT:47384::\"is not\"} using a food journal.  The patient rates current efforts as *** out of 10.  The patient is exercising with a FITT score of:    Frequency Intensity Time Strength Training   []   0, none []   0 []   0 []   0   []   1 (1-2x/week) []   1 (light) []   1 (<10 min) []   1 (1x/week)   []   2 (3-5x/week) []   2 (moderate) []   2 (10-20 min) []   2 (2x/week)   []   3 (daily) []   3 (moderately hard)  []   4 (very hard) []   3 (20-30 min)  []   4 (>30 min) []   3 (3-4x/week)     Review of Systems    Objective   Start weight: *** pounds.    Total Loss lb/%Loss of beginning body weight (BBW): ***lb/***%  Change in weight since last visit: ***    Recent Weight History:   Wt Readings from Last 6 Encounters:   22 76 kg (167 lb 8 oz)   22 78.5 kg (173 lb)   22 85.5 kg (188 lb 8 oz)   22 89.4 kg (197 lb)   03/10/22 94.6 kg (208 lb 8 oz)   22 100 kg (220 lb 8 oz)       There is no height or weight on file to calculate BMI.   Body composition analysis completed and showed:   %body fat: ***  Measurements (in inches)  Waist: ***    Vital Signs:   There were no " vitals taken for this visit.    Physical Exam   Result Review :{Labs  Result Review  Imaging  Med Tab  Media :23}   The following data was reviewed by: JHONNY Lantigua on 09/13/2022:               Assessment / Plan     {CC Problem List  Visit Diagnosis  ROS  Review (Popup)  [facility] Maintenance  Quality  BestPractice  Medications  SmartSets  SnapShot Encounters  Media :23}   There are no diagnoses linked to this encounter.      We discussed the risks, benefits, and limitations of treatments. Continue medications and OTC supplements as discussed. Patient verbalizes understanding of and agreement with management plan.     Follow Up {Instructions Charge Capture  Follow-up Communications :23}  No follow-ups on file.  Patient was given instructions and counseling regarding her condition or for health maintenance advice. Please see specific information pulled into the AVS if appropriate.     I spent 30 minutes on this date of service. This time includes time spent by me in the following activities:preparing for the visit, counseling and educating the patient/family/caregiver, ordering medications, tests, or procedures and documenting information in the medical record.    JHONNY Lantigua  09/13/2022

## 2022-09-13 ENCOUNTER — TELEMEDICINE (OUTPATIENT)
Dept: BARIATRICS/WEIGHT MGMT | Facility: CLINIC | Age: 42
End: 2022-09-13

## 2022-09-13 VITALS — HEIGHT: 64 IN | BODY MASS INDEX: 27.45 KG/M2 | WEIGHT: 160.8 LBS

## 2022-09-13 DIAGNOSIS — E66.3 OVERWEIGHT (BMI 25.0-29.9): Primary | ICD-10-CM

## 2022-09-13 DIAGNOSIS — R73.09 ELEVATED HEMOGLOBIN A1C: ICD-10-CM

## 2022-09-13 PROCEDURE — 99214 OFFICE O/P EST MOD 30 MIN: CPT | Performed by: NURSE PRACTITIONER

## 2022-09-13 NOTE — ASSESSMENT & PLAN NOTE
Patient's (Body mass index is 27.6 kg/m².) indicates that they are overweight with health conditions that include dyslipidemias and pre-diabetic . Weight is improving with treatment. BMI is is above average; BMI management plan is completed. We discussed low calorie, low carb based diet program, portion control, increasing exercise and an kirti-based approach such as [employer] Pal or Lose It.     --Continue Ozempic 0.5  --Has been working on increasing fruit and vegetable intake. Continue this process  --Continue walking

## 2022-09-13 NOTE — PROGRESS NOTES
[facility]   [address]  [place] [address]  [phone]    Name: Deanna Menon  : 1980  Patient Care Team:  Provider, No Known as PCP - General    Chief Complaint;:   Chief Complaint   Patient presents with   • Follow-up      Patient presents during the COVID-19 pandemic/federally declared state of public health emergency.   This service was conducted via Zoom. Patient is being seen via telehealth service due to current public health emergency.     Virtual visit for Deanna Menon, a 42 y.o. female, established patient for medical weight loss.     Patient is satisfied with weight loss progress  Hunger control has improved The patient is exercising. The patient is using a food journal. The patient is checking weight regularly. The patient rates current efforts as 10 out of 10. Body mass index is 27.6 kg/m². and has not reached treatment goal.     Review of Systems   Constitutional: Negative for appetite change and fatigue.   Eyes: Negative for blurred vision and visual disturbance.   Cardiovascular: Negative for chest pain and palpitations.   Gastrointestinal: Negative for abdominal pain, constipation, diarrhea, nausea and GERD.   Endocrine: Negative for polydipsia, polyphagia and polyuria.   Musculoskeletal: Negative for arthralgias and myalgias.   Neurological: Negative for dizziness, tremors, light-headedness, headache and memory problem.        Parasthesias negative   Psychiatric/Behavioral: Negative for sleep disturbance and depressed mood. The patient is not nervous/anxious.        The patient is exercising with a FITT score of:    Frequency   Intensity Time Strength Training   []   0 None  []   0 None  []   0 None  [x]   0 None    []   1 (1-2x/week) []   1 (light) []   1 (<10 min) []   1 (1x/week)   []   2 (3-5x/week) [x]   2 (moderate) []   2 (10-20 min) []   2 (2x/week)   [x]   3 (daily)   []   3 (moderately hard)  []   4 (very hard)  "[]   3 (20-30 min)  [x]   4 (>30 min) []   3 (3-4x/week)       Recent Weight History:   Wt Readings from Last 6 Encounters:   09/13/22 72.9 kg (160 lb 12.8 oz)   08/09/22 76 kg (167 lb 8 oz)   07/07/22 78.5 kg (173 lb)   05/12/22 85.5 kg (188 lb 8 oz)   04/13/22 89.4 kg (197 lb)   03/10/22 94.6 kg (208 lb 8 oz)     Today's weight: 160.8  Last office visit weight: 167.8  Change in weight since last visit: -7  Start Weight: 220.5  Total Loss: 59.8  %Loss of BBW:-27%     VS   Vitals:    09/13/22 0841   Weight: 72.9 kg (160 lb 12.8 oz)   Height: 162.6 cm (64\")       Physical Exam:    General:  .well developed; well nourished  no acute distress  obese      ASSESSMENT/PLAN:   Diagnoses and all orders for this visit:    1. Overweight (BMI 25.0-29.9) (Primary)  Assessment & Plan:  Patient's (Body mass index is 27.6 kg/m².) indicates that they are overweight with health conditions that include dyslipidemias and pre-diabetic . Weight is improving with treatment. BMI is is above average; BMI management plan is completed. We discussed low calorie, low carb based diet program, portion control, increasing exercise and an kirti-based approach such as [employer] Pal or Lose It.     --Continue Ozempic 0.5  --Has been working on increasing fruit and vegetable intake. Continue this process  --Continue walking       2. Elevated hemoglobin A1c  Assessment & Plan:  Well controlled, continue ozempic.        Note: This was an audio and video enabled telemedicine encounter to comply with physical distancing guidelines during the COVID-19 pandemic.  Consent for televisit was obtained prior to the visit. Refills of controlled substances are being provided in this context because of the state of emergency and current social distancing guidance due to COVID-19. We will resume face-to-face visits as permitted and as advised by public health officials.     I spent 30 minutes on this date of service. This time includes time spent by me in the " following activities:preparing for the visit, counseling and educating the patient/family/caregiver, ordering medications, tests, or procedures and documenting information in the medical record.    Plan of care reviewed with the patient at the conclusion of today's visit. We discussed the risks, benefits, and limitations of treatments. Continue medications and OTC supplements as discussed. Patient verbalizes understanding of and agreement with management plan.      Return in about 4 weeks (around 10/11/2022).      Aminah Rosario, APRN

## 2022-10-10 NOTE — PROGRESS NOTES
"  [facility]  [address]  [place] [address]     Office Note      Date: 10/11/2022  Patient Name: Deanna Menon  MRN: [id number]  : 1980  Subjective  Subjective     Chief Complaint  Obesity Management follow-up     {Problem List  Visit Diagnosis   Encounters  Notes  Medications  Labs  Result Review Imaging  Media :23}     Deanna Menon presents to [facility] for obesity management.   Patient is {Satisfied/unsatisfied/unsure:80641} with weight loss progress. Appetite is {poor/mod/well:86374}. Reports no side effects of prescribed medications today. The patient {is / IS NOT:66687::\"is not\"} taking multivitamin and {is / IS NOT:12493::\"is not\"} taking fish oil.  The patient {is / IS NOT:67677::\"is not\"} using a food journal.  The patient rates current efforts as *** out of 10.  The patient is exercising with a FITT score of:    Frequency Intensity Time Strength Training   []   0, none []   0 []   0 []   0   []   1 (1-2x/week) []   1 (light) []   1 (<10 min) []   1 (1x/week)   []   2 (3-5x/week) []   2 (moderate) []   2 (10-20 min) []   2 (2x/week)   []   3 (daily) []   3 (moderately hard)  []   4 (very hard) []   3 (20-30 min)  []   4 (>30 min) []   3 (3-4x/week)     Review of Systems    Objective   Start weight: *** pounds.    Total Loss lb/%Loss of beginning body weight (BBW): ***lb/***%  Change in weight since last visit: ***    Recent Weight History:   Wt Readings from Last 6 Encounters:   22 72.9 kg (160 lb 12.8 oz)   22 76 kg (167 lb 8 oz)   22 78.5 kg (173 lb)   22 85.5 kg (188 lb 8 oz)   22 89.4 kg (197 lb)   03/10/22 94.6 kg (208 lb 8 oz)       There is no height or weight on file to calculate BMI.   Body composition analysis completed and showed:   %body fat: ***  Measurements (in inches)  Waist: ***    Vital Signs:   There were no vitals taken for this visit.    Physical Exam "   Result Review :{Labs  Result Review  Imaging  Med Tab  Media :23}   {The following data was reviewed by (Optional):66968}  {Ambulatory Labs (Optional):20987}           Assessment / Plan     {CC Problem List  Visit Diagnosis  ROS  Review (Popup)  Health Maintenance  Quality  BestPractice  Medications  SmartSets  SnapShot Encounters  Media :23}   There are no diagnoses linked to this encounter.    We discussed the risks, benefits, and limitations of treatments. Continue medications and OTC supplements as discussed. Patient verbalizes understanding of and agreement with management plan.     Follow Up {Instructions Charge Capture  Follow-up Communications :23}  No follow-ups on file.  Patient was given instructions and counseling regarding her condition or for health maintenance advice. Please see specific information pulled into the AVS if appropriate.     I spent *** minutes on this date of service. This time includes time spent by me in the following activities:preparing for the visit, counseling and educating the patient/family/caregiver, ordering medications, tests, or procedures and documenting information in the medical record.    Aminah Rosario, APRN  10/11/2022

## 2022-10-11 ENCOUNTER — TELEMEDICINE (OUTPATIENT)
Dept: BARIATRICS/WEIGHT MGMT | Facility: CLINIC | Age: 42
End: 2022-10-11

## 2022-10-11 VITALS — WEIGHT: 155.6 LBS | HEIGHT: 64 IN | BODY MASS INDEX: 26.56 KG/M2

## 2022-10-11 DIAGNOSIS — E66.3 OVERWEIGHT (BMI 25.0-29.9): Primary | ICD-10-CM

## 2022-10-11 DIAGNOSIS — R73.09 ELEVATED HEMOGLOBIN A1C: ICD-10-CM

## 2022-10-11 PROCEDURE — 99214 OFFICE O/P EST MOD 30 MIN: CPT | Performed by: NURSE PRACTITIONER

## 2022-10-11 NOTE — PROGRESS NOTES
[facility]   [address]  [place] [address]  [phone]    Name: Deanna Menon  : 1980  Patient Care Team:  Provider, No Known as PCP - General    Chief Complaint;:   Chief Complaint   Patient presents with   • Follow-up      Patient presents during the COVID-19 pandemic/federally declared state of public health emergency.   This service was conducted via Zoom. Patient is being seen via telehealth service due to current public health emergency.     Virtual visit for Deanna Menon, a 42 y.o. female, established patient for medical weight loss.     Patient is satisfied with weight loss progress  Hunger control has improved The patient is exercising. The patient is using a food journal. The patient is not checking weight regularly. The patient rates current efforts as 9 out of 10. Body mass index is 26.71 kg/m². and has not reached treatment goal.     Review of Systems   Constitutional: Negative for appetite change and fatigue.   Eyes: Negative for blurred vision, double vision and visual disturbance.   Cardiovascular: Negative for chest pain and palpitations.   Gastrointestinal: Negative for abdominal pain, constipation, diarrhea, nausea, vomiting and GERD.   Endocrine: Negative for polydipsia, polyphagia and polyuria.   Musculoskeletal: Negative for arthralgias, back pain and myalgias.   Neurological: Negative for dizziness, tremors, light-headedness, headache and memory problem.        Parasthesias negative   Psychiatric/Behavioral: Negative for sleep disturbance, depressed mood and stress. The patient is not nervous/anxious.      The patient is exercising with a FITT score of:    Frequency   Intensity Time Strength Training   []   0 None  []   0 None  []   0 None  [x]   0 None    []   1 (1-2x/week) []   1 (light) []   1 (<10 min) []   1 (1x/week)   []   2 (3-5x/week) [x]   2 (moderate) []   2 (10-20 min) []   2 (2x/week)   [x]   3 (daily)    "[]   3 (moderately hard)  []   4 (very hard) []   3 (20-30 min)  [x]   4 (>30 min) []   3 (3-4x/week)       Recent Weight History:   Wt Readings from Last 6 Encounters:   10/11/22 70.6 kg (155 lb 9.6 oz)   09/13/22 72.9 kg (160 lb 12.8 oz)   08/09/22 76 kg (167 lb 8 oz)   07/07/22 78.5 kg (173 lb)   05/12/22 85.5 kg (188 lb 8 oz)   04/13/22 89.4 kg (197 lb)     Today's weight: 155.6  Last office visit weight: 160  Change in weight since last visit: -4.4  Start Weight: 220.5  Total Loss: -64   %Loss of BBW:-29%     VS   Vitals:    10/11/22 0835   Weight: 70.6 kg (155 lb 9.6 oz)   Height: 162.6 cm (64\")       Physical Exam:    General:  .well developed; well nourished  no acute distress  obese      ASSESSMENT/PLAN:   Diagnoses and all orders for this visit:    1. Overweight (BMI 25.0-29.9) (Primary)  Assessment & Plan:  Patient's (Body mass index is 26.71 kg/m².) indicates that they are overweight with health conditions that include dyslipidemias and pre-diabtetes controlled with medicaitons.  . Weight is improving with treatment. BMI is is above average; BMI management plan is completed. We discussed low calorie, low carb based diet program, portion control, increasing exercise, decreasing alcohol consumption and pharmacologic options including Ozempic..     --In person visit next visit. Bring in food journal. Will look over BMR and confirm calorie goal is on track or needs adjusting.     Orders:  -     Semaglutide,0.25 or 0.5MG/DOS, (OZEMPIC) 2 MG/1.5ML solution pen-injector; Inject 0.5 mg under the skin into the appropriate area as directed 1 (One) Time Per Week for 90 days.  Dispense: 4.5 mL; Refill: 0    2. Elevated hemoglobin A1c  Assessment & Plan:  Controlled with Ozempic, held at 0.5. Continue this.     Orders:  -     Semaglutide,0.25 or 0.5MG/DOS, (OZEMPIC) 2 MG/1.5ML solution pen-injector; Inject 0.5 mg under the skin into the appropriate area as directed 1 (One) Time Per Week for 90 days.  Dispense: 4.5 " mL; Refill: 0     Note: This was an audio and video enabled telemedicine encounter to comply with physical distancing guidelines during the COVID-19 pandemic.  Consent for televisit was obtained prior to the visit. Refills of controlled substances are being provided in this context because of the state of emergency and current social distancing guidance due to COVID-19. We will resume face-to-face visits as permitted and as advised by public health officials.     I spent 30 minutes on this date of service. This time includes time spent by me in the following activities:preparing for the visit, counseling and educating the patient/family/caregiver, ordering medications, tests, or procedures and documenting information in the medical record.    Plan of care reviewed with the patient at the conclusion of today's visit. We discussed the risks, benefits, and limitations of treatments. Continue medications and OTC supplements as discussed. Patient verbalizes understanding of and agreement with management plan.      Return in about 4 weeks (around 11/8/2022).      JHONNY Lantigua

## 2022-10-11 NOTE — ASSESSMENT & PLAN NOTE
Patient's (Body mass index is 26.71 kg/m².) indicates that they are overweight with health conditions that include dyslipidemias and pre-diabtetes controlled with medicaitons.  . Weight is improving with treatment. BMI is is above average; BMI management plan is completed. We discussed low calorie, low carb based diet program, portion control, increasing exercise, decreasing alcohol consumption and pharmacologic options including Ozempic..     --In person visit next visit. Bring in food journal. Will look over BMR and confirm calorie goal is on track or needs adjusting.

## 2022-11-08 NOTE — PROGRESS NOTES
[facility]  [address]  [place] [address]     Office Note      Date: 11/10/2022  Patient Name: Deanna Menon  MRN: [id number]  : 1980  Subjective  Subjective     Chief Complaint  Obesity Management follow-up          Deanna Menon presents to [facility] for obesity management.   Patient is satisfied with weight loss progress. Appetite is moderately controlled. Reports no side effects of prescribed medications today. The patient is taking multivitamin and is not taking fish oil.  The patient is using a food journal. Journal daily. The patient rates current efforts as 6 out of 10.    The patient is exercising with a FITT score of:    Frequency Intensity Time Strength Training   []   0, none []   0 []   0 [x]   0   []   1 (1-2x/week) [x]   1 (light) []   1 (<10 min) []   1 (1x/week)   []   2 (3-5x/week) []   2 (moderate) []   2 (10-20 min) []   2 (2x/week)   [x]   3 (daily) []   3 (moderately hard)  []   4 (very hard) []   3 (20-30 min)  [x]   4 (>30 min) []   3 (3-4x/week)     Review of Systems   Constitutional: Negative for appetite change and fatigue.   Eyes: Negative for blurred vision, double vision and visual disturbance.   Cardiovascular: Negative for chest pain and palpitations.   Gastrointestinal: Negative for abdominal pain, constipation, diarrhea, nausea, vomiting and GERD.   Endocrine: Negative for polydipsia, polyphagia and polyuria.   Musculoskeletal: Negative for arthralgias, back pain and myalgias.   Neurological: Negative for dizziness, tremors, light-headedness, headache and memory problem.        Parasthesias negative   Psychiatric/Behavioral: Negative for sleep disturbance, depressed mood and stress. The patient is not nervous/anxious.        Objective   Start weight: 220.5 pounds.    Total Loss lb/%Loss of beginning body weight (BBW): -68.5lb/-31.07%  Change in weight since last visit: -3.6    Recent Weight  "History:   Wt Readings from Last 6 Encounters:   11/10/22 68.9 kg (152 lb)   10/11/22 70.6 kg (155 lb 9.6 oz)   09/13/22 72.9 kg (160 lb 12.8 oz)   08/09/22 76 kg (167 lb 8 oz)   07/07/22 78.5 kg (173 lb)   05/12/22 85.5 kg (188 lb 8 oz)       Body mass index is 26.09 kg/m².   Body composition analysis completed and showed:   %body fat: 32.6  Measurements (in inches)  Waist: 42.5    Vital Signs:   /72   Pulse 76   Ht 162.6 cm (64\")   Wt 68.9 kg (152 lb)   SpO2 100%   BMI 26.09 kg/m²     Physical Exam  Vitals reviewed.   Constitutional:       Appearance: She is well-developed.      Comments: overweight   HENT:      Head:      Comments: masked  Pulmonary:      Effort: Pulmonary effort is normal.   Neurological:      Mental Status: She is alert.   Psychiatric:         Attention and Perception: Attention normal.         Mood and Affect: Mood normal.         Speech: Speech normal.         Behavior: Behavior normal.        Result Review :                Assessment / Plan        Diagnoses and all orders for this visit:    1. Overweight (BMI 25.0-29.9) (Primary)  Assessment & Plan:  Patient's (Body mass index is 26.09 kg/m².) indicates that they are overweight with health conditions that include dyslipidemias and pre-diabetic . Weight is improving with treatment. BMI is is above average; BMI management plan is completed. We discussed low calorie, low carb based diet program, portion control, increasing exercise, pharmacologic options including ozempic and an kirti-based approach such as [employer] Pal or Lose It.   -- This is a follow-up visit and patient is down around 3.5 pounds since last being seen approximately a month ago  -- Basal metabolic rate and body comp analysis reviewed in office today and adjusted calories and macros based on this.  -- She currently is within a healthy fat mass and healthy fat percentage for someone her age and sex.  -- Discussed what we would do as she starts moving towards " maintenance phase.  She still has desire to lose approximately 10 more pounds, which keeps her within the healthiest range for someone her age and sex.  -- Continue Ozempic held at 0.5.  No refill needed at this time      2. Elevated hemoglobin A1c    We discussed the risks, benefits, and limitations of treatments. Continue medications and OTC supplements as discussed. Patient verbalizes understanding of and agreement with management plan.     Follow Up   Return in about 4 weeks (around 12/8/2022).  Patient was given instructions and counseling regarding her condition or for health maintenance advice. Please see specific information pulled into the AVS if appropriate.     I spent 40 minutes on this date of service. This time includes time spent by me in the following activities:preparing for the visit, counseling and educating the patient/family/caregiver, ordering medications, tests, or procedures and documenting information in the medical record.    Aminah Rosario, APRN  11/10/2022

## 2022-11-10 ENCOUNTER — OFFICE VISIT (OUTPATIENT)
Dept: BARIATRICS/WEIGHT MGMT | Facility: CLINIC | Age: 42
End: 2022-11-10

## 2022-11-10 VITALS
BODY MASS INDEX: 25.95 KG/M2 | HEART RATE: 76 BPM | WEIGHT: 152 LBS | OXYGEN SATURATION: 100 % | SYSTOLIC BLOOD PRESSURE: 122 MMHG | DIASTOLIC BLOOD PRESSURE: 72 MMHG | HEIGHT: 64 IN

## 2022-11-10 DIAGNOSIS — R73.09 ELEVATED HEMOGLOBIN A1C: ICD-10-CM

## 2022-11-10 DIAGNOSIS — E66.3 OVERWEIGHT (BMI 25.0-29.9): Primary | ICD-10-CM

## 2022-11-10 PROCEDURE — 99215 OFFICE O/P EST HI 40 MIN: CPT | Performed by: NURSE PRACTITIONER

## 2022-11-10 RX ORDER — FLUCONAZOLE 150 MG/1
TABLET ORAL
COMMUNITY
Start: 2022-09-16

## 2022-11-10 NOTE — ASSESSMENT & PLAN NOTE
Patient's (Body mass index is 26.09 kg/m².) indicates that they are overweight with health conditions that include dyslipidemias and pre-diabetic . Weight is improving with treatment. BMI is is above average; BMI management plan is completed. We discussed low calorie, low carb based diet program, portion control, increasing exercise, pharmacologic options including ozempic and an kirti-based approach such as [employer] Pal or Lose It.   -- This is a follow-up visit and patient is down around 3.5 pounds since last being seen approximately a month ago  -- Basal metabolic rate and body comp analysis reviewed in office today and adjusted calories and macros based on this.  -- She currently is within a healthy fat mass and healthy fat percentage for someone her age and sex.  -- Discussed what we would do as she starts moving towards maintenance phase.  She still has desire to lose approximately 10 more pounds, which keeps her within the healthiest range for someone her age and sex.  -- Continue Ozempic held at 0.5.  No refill needed at this time

## 2022-11-17 ENCOUNTER — TELEPHONE (OUTPATIENT)
Dept: BARIATRICS/WEIGHT MGMT | Facility: CLINIC | Age: 42
End: 2022-11-17

## 2022-11-17 NOTE — TELEPHONE ENCOUNTER
Patient called and asked if she could be prescribed the patch for nausea instead Zofran.   Provider said that she would not prescribe the patch.  Called patient and discussed with her.

## 2022-12-12 NOTE — PROGRESS NOTES
[facility]   [address]  [place] [address]  [phone]    Name: Deanna Menon  : 1980  Patient Care Team:  Provider, No Known as PCP - General    Chief Complaint;:   Chief Complaint   Patient presents with   • Obesity   • Follow-up      Patient presents during the COVID-19 pandemic/federally declared state of public health emergency.   This service was conducted via Zoom. Patient is being seen via telehealth service due to current public health emergency.     Virtual visit for Deanna Menon, a 42 y.o. female, established patient for medical weight loss.     Patient is satisfied with weight loss progress  Hunger control has improved The patient is exercising. The patient is using a food journal. The patient is checking weight regularly.  Body mass index is 25.27 kg/m².  Currently is transitioning into more of a maintenance phase.  We had adjusted her calories at last office visit and she has started to incorporate more carbs in the form of fruits and vegetables.  States she is feel better that she has in a long time healthwise.  Does have hopes to restart resistance training at the beginning of the year.  Insurance requires a 3-month supply of medication.    Review of Systems   Constitutional: Negative for appetite change and fatigue.   Eyes: Negative for blurred vision, double vision and visual disturbance.   Cardiovascular: Negative for chest pain and palpitations.   Gastrointestinal: Positive for constipation (controlled with miralax daily). Negative for abdominal pain, diarrhea, nausea, vomiting and GERD.   Endocrine: Negative for polydipsia, polyphagia and polyuria.   Musculoskeletal: Negative for arthralgias, back pain and myalgias.   Neurological: Negative for dizziness, tremors, light-headedness, headache and memory problem.        Parasthesias negative   Psychiatric/Behavioral: Negative for sleep disturbance, depressed mood and  "stress. The patient is not nervous/anxious.        Recent Weight History:   Wt Readings from Last 6 Encounters:   12/13/22 66.8 kg (147 lb 3.2 oz)   11/10/22 68.9 kg (152 lb)   10/11/22 70.6 kg (155 lb 9.6 oz)   09/13/22 72.9 kg (160 lb 12.8 oz)   08/09/22 76 kg (167 lb 8 oz)   07/07/22 78.5 kg (173 lb)       Today's weight: 147.2  Last office visit weight: 152  Change in weight since last visit: -4.7  Start Weight: 220.5  Total Loss: -73  %Loss of BBW:-33%     VS   Vitals:    12/13/22 0841   Weight: 66.8 kg (147 lb 3.2 oz)   Height: 162.6 cm (64\")     Physical Exam:    General:  .well developed; well nourished  no acute distress  obese      ASSESSMENT/PLAN:   Diagnoses and all orders for this visit:    1. Overweight (BMI 25.0-29.9) (Primary)  Assessment & Plan:  Patient's (Body mass index is 25.27 kg/m².) indicates that they are overweight with health conditions that include dyslipidemias and pre-diabetes, BED . Weight is improving with treatment. BMI is is above average; BMI management plan is completed. We discussed low calorie, low carb based diet program, portion control, increasing exercise, pharmacologic options including ozempic  and an kirti-based approach such as [employer] Pal or Lose It.     --This is a follow up visit and she is down around 5lbs. She has been reintroducing carbs in the forms of fruits and vegetables  --Goal to restart resistance training.  -- She is currently transitioning from weight loss put into more of a maintenance and her calories were adjusted at last office visit.  Continue to be mindful of quality of her foods staying away from processed foods.    Orders:  -     Semaglutide,0.25 or 0.5MG/DOS, (OZEMPIC) 2 MG/1.5ML solution pen-injector; Inject 0.5 mg under the skin into the appropriate area as directed 1 (One) Time Per Week for 90 days.  Dispense: 4.5 mL; Refill: 0    2. Elevated hemoglobin A1c  -     Semaglutide,0.25 or 0.5MG/DOS, (OZEMPIC) 2 MG/1.5ML solution pen-injector; Inject " 0.5 mg under the skin into the appropriate area as directed 1 (One) Time Per Week for 90 days.  Dispense: 4.5 mL; Refill: 0      Note: This was an audio and video enabled telemedicine encounter to comply with physical distancing guidelines during the COVID-19 pandemic.  Consent for televisit was obtained prior to the visit. Refills of controlled substances are being provided in this context because of the state of emergency and current social distancing guidance due to COVID-19. We will resume face-to-face visits as permitted and as advised by public health officials.     I spent 30 minutes on this date of service. This time includes time spent by me in the following activities:preparing for the visit, counseling and educating the patient/family/caregiver, ordering medications, tests, or procedures and documenting information in the medical record.    Plan of care reviewed with the patient at the conclusion of today's visit. We discussed the risks, benefits, and limitations of treatments. Continue medications and OTC supplements as discussed. Patient verbalizes understanding of and agreement with management plan.      Return in about 4 weeks (around 1/10/2023).      JHONNY Lantigua

## 2022-12-13 ENCOUNTER — TELEMEDICINE (OUTPATIENT)
Dept: BARIATRICS/WEIGHT MGMT | Facility: CLINIC | Age: 42
End: 2022-12-13

## 2022-12-13 VITALS — HEIGHT: 64 IN | BODY MASS INDEX: 25.13 KG/M2 | WEIGHT: 147.2 LBS

## 2022-12-13 DIAGNOSIS — E66.3 OVERWEIGHT (BMI 25.0-29.9): Primary | ICD-10-CM

## 2022-12-13 DIAGNOSIS — R73.09 ELEVATED HEMOGLOBIN A1C: ICD-10-CM

## 2022-12-13 PROCEDURE — 99214 OFFICE O/P EST MOD 30 MIN: CPT | Performed by: NURSE PRACTITIONER

## 2022-12-13 NOTE — ASSESSMENT & PLAN NOTE
Patient's (Body mass index is 25.27 kg/m².) indicates that they are overweight with health conditions that include dyslipidemias and pre-diabetes, BED . Weight is improving with treatment. BMI is is above average; BMI management plan is completed. We discussed low calorie, low carb based diet program, portion control, increasing exercise, pharmacologic options including ozempic  and an kirti-based approach such as [employer] Pal or Lose It.     --This is a follow up visit and she is down around 5lbs. She has been reintroducing carbs in the forms of fruits and vegetables  --Goal to restart resistance training.  -- She is currently transitioning from weight loss put into more of a maintenance and her calories were adjusted at last office visit.  Continue to be mindful of quality of her foods staying away from processed foods.

## 2023-01-22 NOTE — PROGRESS NOTES
[facility]  [address]  [place] [address]     Office Note      Date: 2023  Patient Name: Deanna Menon  MRN: [id number]  : 1980  Subjective  Subjective     Chief Complaint  Obesity Management follow-up     Deanna Menon presents to [facility] for obesity management.   Patient is satisfied with weight loss progress. Appetite is well controlled. Reports no side effects of prescribed medications today. The patient is taking multivitamin and is taking fish oil.  The patient is using a food journal. Pt was seen virtually today. She plans to come to office in person next office visit.  The patient is exercising with a FITT score of:    Frequency Intensity Time Strength Training   []   0, none []   0 []   0 []   0   []   1 (1-2x/week) []   1 (light) []   1 (<10 min) [x]   1 (1x/week)   []   2 (3-5x/week) [x]   2 (moderate) []   2 (10-20 min) []   2 (2x/week)   [x]   3 (daily) []   3 (moderately hard)  []   4 (very hard) []   3 (20-30 min)  [x]   4 (>30 min) []   3 (3-4x/week)     Review of Systems   Constitutional: Negative for appetite change and fatigue.   Eyes: Negative for blurred vision, double vision and visual disturbance.   Cardiovascular: Negative for chest pain and palpitations.   Gastrointestinal: Negative for abdominal pain, constipation, diarrhea, nausea, vomiting and GERD.   Endocrine: Negative for polydipsia, polyphagia and polyuria.   Musculoskeletal: Negative for arthralgias, back pain and myalgias.   Neurological: Negative for dizziness, tremors, light-headedness, headache and memory problem.        Parasthesias negative   Psychiatric/Behavioral: Negative for sleep disturbance, depressed mood and stress. The patient is not nervous/anxious.        Objective   Start weight: 220.5 pounds.    Total Loss lb/%Loss of beginning body weight (BBW): 75lb/34%  Change in weight since last visit: -2    Recent Weight  "History:   Wt Readings from Last 6 Encounters:   01/23/23 66 kg (145 lb 9.6 oz)   12/13/22 66.8 kg (147 lb 3.2 oz)   11/10/22 68.9 kg (152 lb)   10/11/22 70.6 kg (155 lb 9.6 oz)   09/13/22 72.9 kg (160 lb 12.8 oz)   08/09/22 76 kg (167 lb 8 oz)       Body mass index is 24.99 kg/m².   Body composition analysis completed and showed:   %body fat: -34%      Vital Signs:   Ht 162.6 cm (64\")   Wt 66 kg (145 lb 9.6 oz)   BMI 24.99 kg/m²       Physical Exam  Constitutional:       Appearance: Normal appearance. She is well-developed.   Neurological:      Mental Status: She is alert.   Psychiatric:         Attention and Perception: Attention normal.         Behavior: Behavior is cooperative.       Assessment / Plan        Diagnoses and all orders for this visit:    1. Encounter for weight management (Primary)  Assessment & Plan:  Patient's (Body mass index is 24.99 kg/m².) indicates that they are overweight with health conditions that include dyslipidemias . Weight is improving with treatment. BMI is is above average; BMI management plan is completed. We discussed low calorie, low carb based diet program, portion control, pharmacologic options including ozempic and an kirti-based approach such as [employer] Pal or Lose It.     --This is a follow up visit and she is down around 2 lbs  --She is currently starting maintain phase, continue Ozempic 0.5 for at least the next month or two. Option of reducing to 0.25 if maintaining well.   --She had set a goal to increase resistance training which she has incorporated around 1x week. Keep up good effort  --She is starting to incorporate more fruits and vegtables    Orders:  -     Semaglutide,0.25 or 0.5MG/DOS, (OZEMPIC) 2 MG/1.5ML solution pen-injector; Inject 0.5 mg under the skin into the appropriate area as directed 1 (One) Time Per Week for 90 days.  Dispense: 4.5 mL; Refill: 0    2. Elevated hemoglobin A1c  -     Semaglutide,0.25 or 0.5MG/DOS, (OZEMPIC) 2 MG/1.5ML solution " pen-injector; Inject 0.5 mg under the skin into the appropriate area as directed 1 (One) Time Per Week for 90 days.  Dispense: 4.5 mL; Refill: 0    3. History of obesity      We discussed the risks, benefits, and limitations of treatments. Continue medications and OTC supplements as discussed. Patient verbalizes understanding of and agreement with management plan.     Follow Up   Return in about 4 weeks (around 2/20/2023).  Patient was given instructions and counseling regarding her condition or for health maintenance advice. Please see specific information pulled into the AVS if appropriate.     I spent 30 minutes on this date of service. This time includes time spent by me in the following activities:preparing for the visit, counseling and educating the patient/family/caregiver, ordering medications, tests, or procedures and documenting information in the medical record.    Aminah Rosario, APRN  01/23/2023

## 2023-01-23 ENCOUNTER — TELEMEDICINE (OUTPATIENT)
Dept: BARIATRICS/WEIGHT MGMT | Facility: CLINIC | Age: 43
End: 2023-01-23
Payer: COMMERCIAL

## 2023-01-23 VITALS — HEIGHT: 64 IN | BODY MASS INDEX: 24.86 KG/M2 | WEIGHT: 145.6 LBS

## 2023-01-23 DIAGNOSIS — Z76.89 ENCOUNTER FOR WEIGHT MANAGEMENT: Primary | ICD-10-CM

## 2023-01-23 DIAGNOSIS — Z86.39 HISTORY OF OBESITY: ICD-10-CM

## 2023-01-23 DIAGNOSIS — R73.09 ELEVATED HEMOGLOBIN A1C: ICD-10-CM

## 2023-01-23 PROCEDURE — 99214 OFFICE O/P EST MOD 30 MIN: CPT | Performed by: NURSE PRACTITIONER

## 2023-01-23 NOTE — ASSESSMENT & PLAN NOTE
Patient's (Body mass index is 24.99 kg/m².) indicates that they are overweight with health conditions that include dyslipidemias . Weight is improving with treatment. BMI is is above average; BMI management plan is completed. We discussed low calorie, low carb based diet program, portion control, pharmacologic options including ozempic and an kirti-based approach such as [employer] Pal or Lose It.     --This is a follow up visit and she is down around 2 lbs  --She is currently starting maintain phase, continue Ozempic 0.5 for at least the next month or two. Option of reducing to 0.25 if maintaining well.   --She had set a goal to increase resistance training which she has incorporated around 1x week. Keep up good effort  --She is starting to incorporate more fruits and vegtables

## 2023-02-13 PROBLEM — Z76.89 ENCOUNTER FOR WEIGHT MANAGEMENT: Status: ACTIVE | Noted: 2022-02-24

## 2023-03-09 ENCOUNTER — TELEMEDICINE (OUTPATIENT)
Dept: BARIATRICS/WEIGHT MGMT | Facility: CLINIC | Age: 43
End: 2023-03-09
Payer: COMMERCIAL

## 2023-03-09 VITALS — BODY MASS INDEX: 25.1 KG/M2 | WEIGHT: 147 LBS | HEIGHT: 64 IN

## 2023-03-09 DIAGNOSIS — Z76.89 ENCOUNTER FOR WEIGHT MANAGEMENT: Primary | ICD-10-CM

## 2023-03-09 DIAGNOSIS — R73.09 ELEVATED HEMOGLOBIN A1C: ICD-10-CM

## 2023-03-09 DIAGNOSIS — Z86.39 HISTORY OF OBESITY: ICD-10-CM

## 2023-03-09 PROCEDURE — 99214 OFFICE O/P EST MOD 30 MIN: CPT | Performed by: NURSE PRACTITIONER

## 2023-03-09 NOTE — ASSESSMENT & PLAN NOTE
--This is a follow up visit and she is up around 2 lbs and currently in maintenance phase   --Continue ozempic 0.5mg  --Consider adding behavioral health as an outlet of fear of regaining weight  --Continue to find balance in eating and exercise  --A1C at next visit

## 2023-04-17 NOTE — PROGRESS NOTES
[facility]  [address]  [place] [address]     Office Note      Date: 2023  Patient Name: Deanna Menon  MRN: [id number]  : 1980  Subjective  Subjective     Chief Complaint  Obesity Management follow-up          Deanna Menon presents to [facility]NA[facility] for obesity management.   Patient is satisfied with weight loss progress. Appetite is moderately controlled. Reports no side effects of prescribed medications today. The patient is taking multivitamin and is not taking fish oil.  The patient is using a food journal.  The patient rates current efforts as 10 out of 10.  The patient is exercising with a FITT score of:    Frequency Intensity Time Strength Training   []   0, none []   0 []   0 []   0   []   1 (1-2x/week) [x]   1 (light) []   1 (<10 min) []   1 (1x/week)   []   2 (3-5x/week) []   2 (moderate) []   2 (10-20 min) [x]   2 (2x/week)   [x]   3 (daily) [x]   3 (moderately hard)  []   4 (very hard) []   3 (20-30 min)  [x]   4 (>30 min) []   3 (3-4x/week)     Review of Systems   Constitutional: Negative for appetite change and fatigue.   Eyes: Negative for blurred vision, double vision and visual disturbance.   Cardiovascular: Negative for chest pain and palpitations.   Gastrointestinal: Negative for abdominal pain, constipation, diarrhea, nausea, vomiting and GERD.   Endocrine: Negative for polydipsia, polyphagia and polyuria.   Musculoskeletal: Negative for arthralgias, back pain and myalgias.   Neurological: Negative for dizziness, tremors, light-headedness, headache and memory problem.        Parasthesias negative   Psychiatric/Behavioral: Negative for sleep disturbance, depressed mood and stress. The patient is not nervous/anxious.        Objective   Start weight: 220.5 pounds.    Total Loss lb/%Loss of beginning body weight (BBW): -72lb/-32.65%  Change in weight since last visit: +1.5    Recent Weight History:   Wt  "Readings from Last 6 Encounters:   04/18/23 67.5 kg (148 lb 12.8 oz)   03/09/23 66.7 kg (147 lb)   01/23/23 66 kg (145 lb 9.6 oz)   12/13/22 66.8 kg (147 lb 3.2 oz)   11/10/22 68.9 kg (152 lb)   10/11/22 70.6 kg (155 lb 9.6 oz)       Body mass index is 25.54 kg/m².   Body composition analysis completed and showed:   %body fat: 31.3  Measurements (in inches)  Waist: 29    Vital Signs:   /64   Pulse 61   Ht 162.6 cm (64\")   Wt 67.5 kg (148 lb 12.8 oz)   SpO2 100%   BMI 25.54 kg/m²       Physical Exam  Vitals reviewed.   Constitutional:       Appearance: She is obese. She is not ill-appearing.   Pulmonary:      Effort: Pulmonary effort is normal.   Neurological:      Mental Status: She is alert.   Psychiatric:         Attention and Perception: Attention and perception normal.         Behavior: Behavior is cooperative.        Result Review :     Most Recent A1C        8/9/2022    09:09   HGBA1C Most Recent   Hemoglobin A1C 5.20             Assessment / Plan        Diagnoses and all orders for this visit:    1. Encounter for weight management (Primary)  Assessment & Plan:  This is a follow-up maintenance visit and she is to goal.  Continue Ozempic 0.5.  A1c drawn today.  Continue to be mindful of weight and weight daily.  She is done an excellent job of increasing exercise including 2 days of strength training.  Keep up this good work    Orders:  -     Hemoglobin A1c  -     Semaglutide,0.25 or 0.5MG/DOS, (OZEMPIC) 2 MG/1.5ML solution pen-injector; Inject 0.5 mg under the skin into the appropriate area as directed 1 (One) Time Per Week for 90 days.  Dispense: 4.5 mL; Refill: 0    2. Elevated hemoglobin A1c  -     Hemoglobin A1c  -     Semaglutide,0.25 or 0.5MG/DOS, (OZEMPIC) 2 MG/1.5ML solution pen-injector; Inject 0.5 mg under the skin into the appropriate area as directed 1 (One) Time Per Week for 90 days.  Dispense: 4.5 mL; Refill: 0      We discussed the risks, benefits, and limitations of treatments. " Continue medications and OTC supplements as discussed. Patient verbalizes understanding of and agreement with management plan.     Follow Up   Return in about 4 weeks (around 5/16/2023).  Patient was given instructions and counseling regarding her condition or for health maintenance advice. Please see specific information pulled into the AVS if appropriate.     I spent 30 minutes on this date of service. This time includes time spent by me in the following activities:preparing for the visit, counseling and educating the patient/family/caregiver, ordering medications, tests, or procedures and documenting information in the medical record.    Aminah Rosario, APRN  04/18/2023

## 2023-04-18 ENCOUNTER — OFFICE VISIT (OUTPATIENT)
Dept: BARIATRICS/WEIGHT MGMT | Facility: CLINIC | Age: 43
End: 2023-04-18
Payer: COMMERCIAL

## 2023-04-18 VITALS
SYSTOLIC BLOOD PRESSURE: 116 MMHG | OXYGEN SATURATION: 100 % | HEIGHT: 64 IN | BODY MASS INDEX: 25.4 KG/M2 | HEART RATE: 61 BPM | DIASTOLIC BLOOD PRESSURE: 64 MMHG | WEIGHT: 148.8 LBS

## 2023-04-18 DIAGNOSIS — Z76.89 ENCOUNTER FOR WEIGHT MANAGEMENT: Primary | ICD-10-CM

## 2023-04-18 DIAGNOSIS — R73.09 ELEVATED HEMOGLOBIN A1C: ICD-10-CM

## 2023-04-18 NOTE — ASSESSMENT & PLAN NOTE
This is a follow-up maintenance visit and she is to goal.  Continue Ozempic 0.5.  A1c drawn today.  Continue to be mindful of weight and weight daily.  She is done an excellent job of increasing exercise including 2 days of strength training.  Keep up this good work  We will continue monthly visits with the options of spacing these out to every 2 to 3 months if she feels comfortable.  Body comp analysis was reviewed and calorie and macro goals adjusted based on basal metabolic rate.

## 2023-04-19 LAB — HBA1C MFR BLD: 5.3 % (ref 4.8–5.6)

## 2023-05-18 NOTE — PROGRESS NOTES
Office Note      Date: 2023  Patient Name: Deanna Menon  MRN: [id number]  : 1980    Patient presents during the COVID-19 pandemic/federally declared [facility] public health emergency.  This service was conducted via [employer].  Patient is located in the [facility]. Provider is located at her primary office location. The use of a video visit has been reviewed with the patient and verbal informed consent has been obtained.      Subjective     Chief Complaint  Obesity Management follow-up    Subjective          Deanna Menon presents to [facility]NAGEMENT via telehealth for obesity management.       Patient is satisfied with weight loss progress. Appetite is well controlled. Reports no side effects of prescribed medications today except occasional nausea which is controlled with Zofran.  Requesting a refill on this today prefers the sublingual versus tablet.  Currently taking Ozempic 0.5 and glucose is controlled.  Reports being very satisfied maintaining weight goal for over 6 months of not regaining has been a trend in the past.  Feels ready to start spacing out visits out to every 2 months and prefers in person visits for  The patient is exercising with a FITT score of:    Frequency Intensity Time Strength Training   []   0, none []   0 []   0 []   0   []   1 (1-2x/week) []   1 (light) []   1 (<10 min) []   1 (1x/week)   [x]   2 (3-5x/week) []   2 (moderate) []   2 (10-20 min) [x]   2 (2x/week)   []   3 (daily) [x]   3 (moderately hard)  []   4 (very hard) []   3 (20-30 min)  [x]   4 (>30 min) []   3 (3-4x/week)       Review of Systems   Constitutional: Negative for appetite change and fatigue.   Eyes: Negative for blurred vision, double vision and visual disturbance.   Cardiovascular: Negative for chest pain and palpitations.   Gastrointestinal: Positive for nausea (occational, mild related to ozempic). Negative for abdominal pain, constipation, diarrhea, vomiting  "and GERD.   Endocrine: Negative for polydipsia, polyphagia and polyuria.   Musculoskeletal: Negative for arthralgias, back pain and myalgias.   Neurological: Negative for dizziness, tremors, light-headedness, headache and memory problem.        Parasthesias negative   Psychiatric/Behavioral: Negative for sleep disturbance, depressed mood and stress. The patient is not nervous/anxious.        Objective   Body mass index is 25.58 kg/m².  Start weight: 220.5 pounds.    Total weight loss: -71 pounds/-32%  Change in weight since last visit: 0    Wt Readings from Last 6 Encounters:   05/22/23 0910 67.6 kg (149 lb)   04/18/23 0833 67.5 kg (148 lb 12.8 oz)   03/09/23 0930 66.7 kg (147 lb)   01/23/23 0840 66 kg (145 lb 9.6 oz)   12/13/22 0841 66.8 kg (147 lb 3.2 oz)   11/10/22 0810 68.9 kg (152 lb)       General:  .well developed; well nourished  no acute distress  obese        Ht 162.6 cm (64\")   Wt 67.6 kg (149 lb)   BMI 25.58 kg/m²       Result Review :     Most Recent A1C        4/18/2023    09:00   HGBA1C Most Recent   Hemoglobin A1C 5.30                 Assessment and Plan    Diagnoses and all orders for this visit:    1. Encounter for weight management (Primary)  Assessment & Plan:  This is a follow up visit and she is around the same weight.   Space visit visit to q2 months  Currently in maintaine phase   Continue Ozempic  A1C reviewed at 5.3 within normal range.   Continue goal of \"80/20\" and stay with meal plan    Orders:  -     Semaglutide,0.25 or 0.5MG/DOS, (OZEMPIC) 2 MG/1.5ML solution pen-injector; Inject 0.5 mg under the skin into the appropriate area as directed 1 (One) Time Per Week for 90 days.  Dispense: 6 mL; Refill: 0    2. Elevated hemoglobin A1c  -     Semaglutide,0.25 or 0.5MG/DOS, (OZEMPIC) 2 MG/1.5ML solution pen-injector; Inject 0.5 mg under the skin into the appropriate area as directed 1 (One) Time Per Week for 90 days.  Dispense: 6 mL; Refill: 0    Other orders  -     ondansetron ODT " (ZOFRAN-ODT) 8 MG disintegrating tablet; Place 1 tablet on the tongue Every 8 (Eight) Hours As Needed for Nausea or Vomiting for up to 30 days.  Dispense: 30 tablet; Refill: 1      We discussed the risks, benefits, and limitations of treatments. Continue medications and OTC supplements as discussed. Patient verbalizes understanding of and agreement with management plan.       Follow Up   No follow-ups on file.  I spent 30 minutes on this date of service. This time includes time spent by me in the following activities:preparing for the visit, counseling and educating the patient/family/caregiver, ordering medications, tests, or procedures and documenting information in the medical record.  Patient was given instructions and counseling regarding her condition or for health maintenance advice. Please see specific information pulled into the AVS if appropriate.     JHONNY Lantigua

## 2023-05-22 ENCOUNTER — TELEMEDICINE (OUTPATIENT)
Dept: BARIATRICS/WEIGHT MGMT | Facility: CLINIC | Age: 43
End: 2023-05-22
Payer: COMMERCIAL

## 2023-05-22 VITALS — WEIGHT: 149 LBS | HEIGHT: 64 IN | BODY MASS INDEX: 25.44 KG/M2

## 2023-05-22 DIAGNOSIS — R73.09 ELEVATED HEMOGLOBIN A1C: ICD-10-CM

## 2023-05-22 DIAGNOSIS — Z76.89 ENCOUNTER FOR WEIGHT MANAGEMENT: Primary | ICD-10-CM

## 2023-05-22 RX ORDER — ONDANSETRON 8 MG/1
8 TABLET, ORALLY DISINTEGRATING ORAL EVERY 8 HOURS PRN
Qty: 30 TABLET | Refills: 1 | Status: SHIPPED | OUTPATIENT
Start: 2023-05-22 | End: 2023-06-21

## 2023-05-22 NOTE — ASSESSMENT & PLAN NOTE
"This is a follow up visit and she is around the same weight.   Space visit visit to q2 months  Currently in maintaine phase   Continue Ozempic  A1C reviewed at 5.3 within normal range.   Continue goal of \"80/20\" and stay with meal plan  "

## 2023-05-30 ENCOUNTER — PRIOR AUTHORIZATION (OUTPATIENT)
Dept: BARIATRICS/WEIGHT MGMT | Facility: CLINIC | Age: 43
End: 2023-05-30

## 2023-05-30 NOTE — TELEPHONE ENCOUNTER
Deanna Menon (Key: [id number])  Rx #: [id number]  Ozempic (0.25 or 0.5 MG/DOSE) 2MG/3ML pen-injectors    Approved 5/30/2023 5/30/2024

## 2023-09-26 NOTE — PROGRESS NOTES
[facility]  [address]  [place] [address]     Office Note      Date: 2023  Patient Name: Deanna Menon  MRN: [id number]  : 1980    Subjective     Chief Complaint  Obesity Management follow-up    Deanna Menon presents to [facility] for obesity management.     Patient is unsure with weight loss progress. Appetite is poorly controlled. Reports no side effects of prescribed medications today. The patient is taking multivitamin and is not taking fish oil.  The patient is using a food journal.  The patient rates current efforts as 7 out of 10.  The patient is exercising with a FITT score of: 11    Frequency Intensity Time Strength Training   []   0, none []   0 []   0 []   0   []   1 (1-2x/week) [x]   1 (light) []   1 (<10 min) []   1 (1x/week)   []   2 (3-5x/week) []   2 (moderate) []   2 (10-20 min) []   2 (2x/week)   [x]   3 (daily) []   3 (moderately hard)  []   4 (very hard) []   3 (20-30 min)  [x]   4 (>30 min) [x]   3 (3-4x/week)     Review of Systems   Constitutional:  Negative for appetite change and fatigue.   Eyes:  Negative for blurred vision, double vision and visual disturbance.   Cardiovascular:  Negative for chest pain and palpitations.   Gastrointestinal:  Negative for abdominal pain, constipation, diarrhea, nausea, vomiting and GERD.   Endocrine: Negative for polydipsia, polyphagia and polyuria.   Musculoskeletal:  Negative for arthralgias, back pain and myalgias.   Neurological:  Negative for dizziness, tremors, light-headedness, headache and memory problem.        Parasthesias negative   Psychiatric/Behavioral:  Negative for sleep disturbance, depressed mood and stress. The patient is not nervous/anxious.    All other systems reviewed and are negative.    Objective   Start weight: 220 pounds.    Total Loss lb/%Loss of beginning body weight (BBW): -62 lb/-28.19%  Change in weight since last visit:  "+6    Recent Weight History:   Wt Readings from Last 6 Encounters:   09/28/23 71.7 kg (158 lb)   07/20/23 68.9 kg (152 lb)   05/22/23 67.6 kg (149 lb)   04/18/23 67.5 kg (148 lb 12.8 oz)   03/09/23 66.7 kg (147 lb)   01/23/23 66 kg (145 lb 9.6 oz)       Body mass index is 27.12 kg/m².   Body composition analysis completed and showed:   %body fat: 33.0  Measurements (in inches)  Waist: 29.5    Vital Signs:   /70 (BP Location: Left arm, Patient Position: Sitting)   Pulse 82   Resp 16   Ht 162.6 cm (64\")   Wt 71.7 kg (158 lb)   SpO2 99%   BMI 27.12 kg/m²       Physical Exam  Vitals reviewed.   Constitutional:       Appearance: She is well-developed.      Comments: overweight   Pulmonary:      Effort: Pulmonary effort is normal.   Neurological:      Mental Status: She is alert.   Psychiatric:         Attention and Perception: Attention normal.         Mood and Affect: Mood normal.         Speech: Speech normal.         Behavior: Behavior normal.      Result Review :     Common labs          4/18/2023    09:00   Common Labs   Hemoglobin A1C 5.30               Assessment / Plan        Diagnoses and all orders for this visit:    1. Overweight (BMI 25.0-29.9) (Primary)  Assessment & Plan:  Patient's (Body mass index is 27.12 kg/m².) indicates that they are overweight with health conditions that include diabetes mellitus and dyslipidemias . Weight is improving with treatment. BMI is is above average; BMI management plan is completed. We discussed low calorie, low carb based diet program, portion control, increasing exercise, pharmacologic options including Ozempic, and an kirti-based approach such as [employer] Pal or Lose It.     --This is a follow up visit and she is up 6 lbs in the past 3 months  --Continue ozempic 0.5mg  -- body analysis was reviewed as well as goals set up on journaling at home.  Adjusted carb and protein goal so carb is 40% of the macros and carb is 30%.  -- A1c completed today.  Discussed the " possibility of keeping the medication at the same dose or increasing if we notice an elevated glucose.  -- We last saw her 3 months ago and she continues to do an excellent job of food journaling and she is reviewing and noting days that are higher or lower calories.  Continue to monitor where calories, protein, carbohydrates, and fiber are staying.  -- We will see back in a little over a month and decide if we will continue monthly or continue to space out visits.      Orders:  -     Hemoglobin A1c    2. Controlled type 2 diabetes mellitus without complication, without long-term current use of insulin  -     Hemoglobin A1c    3. Elevated hemoglobin A1c  -     Semaglutide,0.25 or 0.5MG/DOS, (OZEMPIC) 2 MG/1.5ML solution pen-injector; Inject 0.5 mg under the skin into the appropriate area as directed 1 (One) Time Per Week for 112 days.  Dispense: 3 mL; Refill: 0    4. Encounter for weight management  -     Semaglutide,0.25 or 0.5MG/DOS, (OZEMPIC) 2 MG/1.5ML solution pen-injector; Inject 0.5 mg under the skin into the appropriate area as directed 1 (One) Time Per Week for 112 days.  Dispense: 3 mL; Refill: 0        We discussed the risks, benefits, and limitations of treatments. Continue medications and OTC supplements as discussed. Patient verbalizes understanding of and agreement with management plan.     Follow Up   Return in about 4 weeks (around 10/26/2023).  Patient was given instructions and counseling regarding her condition or for health maintenance advice. Please see specific information pulled into the AVS if appropriate.     I spent 40 minutes on this date of service. This time includes time spent by me in the following activities:preparing for the visit, counseling and educating the patient/family/caregiver, ordering medications, tests, or procedures and documenting information in the medical record.    Aminah Rosario, APRN  09/28/2023

## 2023-09-28 ENCOUNTER — OFFICE VISIT (OUTPATIENT)
Dept: BARIATRICS/WEIGHT MGMT | Facility: CLINIC | Age: 43
End: 2023-09-28
Payer: COMMERCIAL

## 2023-09-28 VITALS
HEIGHT: 64 IN | BODY MASS INDEX: 26.98 KG/M2 | SYSTOLIC BLOOD PRESSURE: 106 MMHG | RESPIRATION RATE: 16 BRPM | DIASTOLIC BLOOD PRESSURE: 70 MMHG | OXYGEN SATURATION: 99 % | HEART RATE: 82 BPM | WEIGHT: 158 LBS

## 2023-09-28 DIAGNOSIS — R73.09 ELEVATED HEMOGLOBIN A1C: ICD-10-CM

## 2023-09-28 DIAGNOSIS — E66.3 OVERWEIGHT (BMI 25.0-29.9): Primary | ICD-10-CM

## 2023-09-28 DIAGNOSIS — E11.9 CONTROLLED TYPE 2 DIABETES MELLITUS WITHOUT COMPLICATION, WITHOUT LONG-TERM CURRENT USE OF INSULIN: ICD-10-CM

## 2023-09-28 DIAGNOSIS — Z76.89 ENCOUNTER FOR WEIGHT MANAGEMENT: ICD-10-CM

## 2023-09-28 LAB — HBA1C MFR BLD: 5.4 % (ref 4.8–5.6)

## 2023-09-28 RX ORDER — MULTIVIT WITH MINERALS/LUTEIN
250 TABLET ORAL DAILY
COMMUNITY

## 2023-09-28 RX ORDER — SEMAGLUTIDE 0.68 MG/ML
0.5 INJECTION, SOLUTION SUBCUTANEOUS WEEKLY
Qty: 3 ML | Refills: 2 | Status: CANCELLED | OUTPATIENT
Start: 2023-09-28 | End: 2023-12-27

## 2023-09-28 NOTE — ASSESSMENT & PLAN NOTE
Patient's (Body mass index is 27.12 kg/m².) indicates that they are overweight with health conditions that include diabetes mellitus and dyslipidemias . Weight is improving with treatment. BMI is is above average; BMI management plan is completed. We discussed low calorie, low carb based diet program, portion control, increasing exercise, pharmacologic options including Ozempic, and an kirti-based approach such as [employer] Pal or Lose It.     --This is a follow up visit and she is up 6 lbs in the past 3 months  --Continue ozempic 0.5mg  -- body analysis was reviewed as well as goals set up on journaling at home.  Adjusted carb and protein goal so carb is 40% of the macros and carb is 30%.  -- A1c completed today.  Discussed the possibility of keeping the medication at the same dose or increasing if we notice an elevated glucose.  -- We last saw her 3 months ago and she continues to do an excellent job of food journaling and she is reviewing and noting days that are higher or lower calories.  Continue to monitor where calories, protein, carbohydrates, and fiber are staying.  -- We will see back in a little over a month and decide if we will continue monthly or continue to space out visits.

## 2023-10-07 RX ORDER — ONDANSETRON 8 MG/1
TABLET, ORALLY DISINTEGRATING ORAL
Qty: 30 TABLET | Refills: 1 | Status: SHIPPED | OUTPATIENT
Start: 2023-10-07

## 2023-11-07 NOTE — PROGRESS NOTES
"  [facility]  [address]  [place] [address]     Office Note      Date: 2023  Patient Name: Deanna Menno  MRN: [id number]  : 1980    Subjective     Chief Complaint  Obesity Management follow-up    Deanna Menon presents to [facility] for obesity management.  {Problem List  Visit Diagnosis   Encounters  Notes  Medications  Labs  Result Review Imaging  Media :23}   Patient is {Satisfied/unsatisfied/unsure:05220} with weight loss progress. Appetite is {poor/mod/well:29556}. Reports no side effects of prescribed medications today. The patient {is / IS NOT:34845::\"is not\"} taking multivitamin and {is / IS NOT:04607::\"is not\"} taking fish oil.  The patient {is / IS NOT:56735::\"is not\"} using a food journal.  The patient rates current efforts as *** out of 10.  The patient is exercising with a FITT score of:    Frequency Intensity Time Strength Training   []   0, none []   0 []   0 []   0   []   1 (1-2x/week) []   1 (light) []   1 (<10 min) []   1 (1x/week)   []   2 (3-5x/week) []   2 (moderate) []   2 (10-20 min) []   2 (2x/week)   []   3 (daily) []   3 (moderately hard)  []   4 (very hard) []   3 (20-30 min)  []   4 (>30 min) []   3 (3-4x/week)     Review of Systems    Objective   Start weight: *** pounds.    Total Loss lb/%Loss of beginning body weight (BBW): ***lb/***%  Change in weight since last visit: ***    Recent Weight History:   Wt Readings from Last 6 Encounters:   23 71.7 kg (158 lb)   23 68.9 kg (152 lb)   23 67.6 kg (149 lb)   23 67.5 kg (148 lb 12.8 oz)   23 66.7 kg (147 lb)   23 66 kg (145 lb 9.6 oz)         There is no height or weight on file to calculate BMI.   Body composition analysis completed and showed:        Measurements (in inches)       Vital Signs:   There were no vitals taken for this visit.      Physical Exam   Result Review :{Labs  Result Review  " Imaging  Med Tab  Media :23}   {The following data was reviewed by (Optional):30725}  {Ambulatory Labs (Optional):82752}           Assessment / Plan     {CC Problem List  Visit Diagnosis  ROS  Review (Popup)  Health Maintenance  Quality  BestPractice  Medications  SmartSets  SnapShot Encounters  Media :23}   Diagnoses and all orders for this visit:    1. Overweight (BMI 25.0-29.9) (Primary)        We discussed the risks, benefits, and limitations of treatments. Continue medications and OTC supplements as discussed. Patient verbalizes understanding of and agreement with management plan.     Follow Up {Instructions Charge Capture  Follow-up Communications :23}  No follow-ups on file.  Patient was given instructions and counseling regarding her condition or for health maintenance advice. Please see specific information pulled into the AVS if appropriate.     I spent *** minutes on this date of service. This time includes time spent by me in the following activities:preparing for the visit, counseling and educating the patient/family/caregiver, ordering medications, tests, or procedures and documenting information in the medical record.    Aminah Rosario, APRN  11/09/2023

## 2023-11-09 ENCOUNTER — TELEMEDICINE (OUTPATIENT)
Dept: BARIATRICS/WEIGHT MGMT | Facility: CLINIC | Age: 43
End: 2023-11-09
Payer: COMMERCIAL

## 2023-11-09 ENCOUNTER — PRIOR AUTHORIZATION (OUTPATIENT)
Dept: BARIATRICS/WEIGHT MGMT | Facility: CLINIC | Age: 43
End: 2023-11-09
Payer: COMMERCIAL

## 2023-11-09 VITALS — HEIGHT: 64 IN | BODY MASS INDEX: 26.8 KG/M2 | WEIGHT: 157 LBS

## 2023-11-09 DIAGNOSIS — Z76.89 ENCOUNTER FOR WEIGHT MANAGEMENT: ICD-10-CM

## 2023-11-09 DIAGNOSIS — E66.3 OVERWEIGHT (BMI 25.0-29.9): Primary | ICD-10-CM

## 2023-11-09 DIAGNOSIS — R73.09 ELEVATED HEMOGLOBIN A1C: ICD-10-CM

## 2023-11-09 DIAGNOSIS — E11.9 CONTROLLED TYPE 2 DIABETES MELLITUS WITHOUT COMPLICATION, WITHOUT LONG-TERM CURRENT USE OF INSULIN: ICD-10-CM

## 2023-11-09 RX ORDER — SEMAGLUTIDE 1.34 MG/ML
1 INJECTION, SOLUTION SUBCUTANEOUS WEEKLY
Qty: 9 ML | Refills: 0 | Status: SHIPPED | OUTPATIENT
Start: 2023-11-09 | End: 2024-02-01

## 2023-11-09 NOTE — PROGRESS NOTES
"     Office Note      Date: 2023  Patient Name: Deanna Menon  MRN: [id number]  : 1980    This service was conducted via Digg.  Patient is located in [place]  Provider is located at her office address. The use of a video visit has been reviewed with the patient and informed consent has been obtained.    Subjective     Chief Complaint  Obesity Management follow-up    Subjective          Deanna Menon presents to [facility] via telehealth for obesity management. Reports no side effect from Ozempic 0.5mg.     Patient is satisfied with weight loss progress. Appetite is moderately controlled. Reports no side effects of prescribed medications today.       Review of Systems   Constitutional:  Negative for appetite change and fatigue.   Eyes:  Negative for blurred vision, double vision and visual disturbance.   Cardiovascular:  Negative for chest pain and palpitations.   Gastrointestinal:  Negative for abdominal pain, constipation, diarrhea, nausea, vomiting and GERD.   Endocrine: Negative for polydipsia, polyphagia and polyuria.   Musculoskeletal:  Negative for arthralgias, back pain and myalgias.   Neurological:  Negative for dizziness, tremors, light-headedness, headache and memory problem.        Parasthesias negative   Psychiatric/Behavioral:  Negative for sleep disturbance, depressed mood and stress. The patient is not nervous/anxious.        Objective   Body mass index is 26.95 kg/m².  Start weight: 198 pounds.    Total weight loss: 41 pounds/-25%  Change in weight since last visit: +1    Wt Readings from Last 6 Encounters:   23 0844 71.2 kg (157 lb)   23 1104 71.7 kg (158 lb)   23 0847 68.9 kg (152 lb)   23 0910 67.6 kg (149 lb)   23 0833 67.5 kg (148 lb 12.8 oz)   23 0930 66.7 kg (147 lb)         General:  .well developed; well nourished  no acute distress  obese        Ht 162.6 cm (64\")   Wt 71.2 kg (157 lb)   BMI 26.95 " kg/m²       Result Review :     Common labs          4/18/2023    09:00 9/28/2023    11:50   Common Labs   Hemoglobin A1C 5.30  5.40                Assessment and Plan    Diagnoses and all orders for this visit:    1. Overweight (BMI 25.0-29.9) (Primary)  Assessment & Plan:  Patient's (Body mass index is 26.95 kg/m².) indicates that they are overweight with health conditions that include dyslipidemias and pre-diabetic  . Weight is improving with treatment. BMI is is above average; BMI management plan is completed. We discussed low calorie, low carb based diet program, portion control, increasing exercise, pharmacologic options including ozempic, and an kirti-based approach such as [employer] Pal or Lose It.   -- This is a follow-up visit and patient is around the same weight as she was approximately month and a half ago.  --A1c reviewed it is within normal level but mildly elevated from prior A1c.  Okay to increase Ozempic 0.5 mg to 1 mg.      Orders:  -     Semaglutide, 1 MG/DOSE, (Ozempic, 1 MG/DOSE,) 4 MG/3ML solution pen-injector; Inject 1 mg under the skin into the appropriate area as directed 1 (One) Time Per Week for 84 days.  Dispense: 9 mL; Refill: 0    2. Elevated hemoglobin A1c    3. Encounter for weight management    4. Controlled type 2 diabetes mellitus without complication, without long-term current use of insulin  -     Semaglutide, 1 MG/DOSE, (Ozempic, 1 MG/DOSE,) 4 MG/3ML solution pen-injector; Inject 1 mg under the skin into the appropriate area as directed 1 (One) Time Per Week for 84 days.  Dispense: 9 mL; Refill: 0    We discussed the risks, benefits, and limitations of treatments. Continue medications and OTC supplements as discussed. Patient verbalizes understanding of and agreement with management plan.     Follow Up   Return in about 4 weeks (around 12/7/2023).  I spent 30 minutes on this date of service. This time includes time spent by me in the following activities:preparing for the visit,  counseling and educating the patient/family/caregiver, ordering medications, tests, or procedures and documenting information in the medical record.  Patient was given instructions and counseling regarding her condition or for health maintenance advice. Please see specific information pulled into the AVS if appropriate.     Aminah Rosario, APRN

## 2023-11-09 NOTE — ASSESSMENT & PLAN NOTE
Patient's (Body mass index is 26.95 kg/m².) indicates that they are overweight with health conditions that include dyslipidemias and pre-diabetic  . Weight is improving with treatment. BMI is is above average; BMI management plan is completed. We discussed low calorie, low carb based diet program, portion control, increasing exercise, pharmacologic options including ozempic, and an kirti-based approach such as [employer] Pal or Lose It.   -- This is a follow-up visit and patient is around the same weight as she was approximately month and a half ago.  --A1c reviewed it is within normal level but mildly elevated from prior A1c.  Okay to increase Ozempic 0.5 mg to 1 mg.

## 2023-12-18 NOTE — PROGRESS NOTES
Office Note      Date: 2023  Patient Name: Deanna Menon  MRN: [id number]  : 1980    This service was conducted via [employer].  Patient is located in [place]  Provider is located at her office address. The use of a video visit has been reviewed with the patient and informed consent has been obtained.    Subjective     Chief Complaint  Obesity Management follow-up    Subjective          Deanna Menon presents to [facility] via telehealth for obesity management.  Reports that she is tolerating the Ozempic 1 mg.  Reports that she has been watching her carbs a bit closer reducing at 3 PM snack and interchanging it for a protein shake.  Reports being very happy with getting closer into lower 150s.  In the recent past she was in the 140s and this is where she felt the best.        Patient is satisfied with weight loss progress. Appetite is well controlled. Reports no side effects of prescribed medications today.   The patient is exercising with a FITT score of:    Frequency Intensity Time Strength Training   []   0, none []   0 []   0 []   0   []   1 (1-2x/week) []   1 (light) []   1 (<10 min) [x]   1 (1x/week)   [x]   2 (3-5x/week) []   2 (moderate) []   2 (10-20 min) []   2 (2x/week)   []   3 (daily) [x]   3 (moderately hard)  []   4 (very hard) []   3 (20-30 min)  [x]   4 (>30 min) []   3 (3-4x/week)       Review of Systems   Constitutional:  Negative for appetite change and fatigue.   Eyes:  Negative for visual disturbance.   Cardiovascular:  Negative for chest pain and palpitations.   Gastrointestinal:  Negative for constipation and indigestion.   Neurological:  Negative for light-headedness.       Objective   Body mass index is 25.88 kg/m².  Start weight: 198 pounds.    Total weight loss: 48 pounds/-25%  Change in weight since last visit: 6.5    Wt Readings from Last 6 Encounters:   23 1104 68.4 kg (150 lb 12.8 oz)   23 0844 71.2 kg (157 lb)  "  09/28/23 1104 71.7 kg (158 lb)   07/20/23 0847 68.9 kg (152 lb)   05/22/23 0910 67.6 kg (149 lb)   04/18/23 0833 67.5 kg (148 lb 12.8 oz)       General:  .well developed; well nourished  no acute distress  obese        Ht 162.6 cm (64\")   Wt 68.4 kg (150 lb 12.8 oz)   BMI 25.88 kg/m²       Result Review :     Common labs          4/18/2023    09:00 9/28/2023    11:50   Common Labs   Hemoglobin A1C 5.30  5.40                Assessment and Plan    Diagnoses and all orders for this visit:    1. Overweight (BMI 25.0-29.9) (Primary)  Assessment & Plan:  Patient's (Body mass index is 25.88 kg/m².) indicates that they are overweight with health conditions that include dyslipidemias . Weight is improving with treatment. BMI is is above average; BMI management plan is completed. We discussed low calorie, low carb based diet program, portion control, increasing exercise, pharmacologic options including ozempic, and an kirti-based approach such as [employer] Pal or Lose It.   --This is a follw up visit and she is down arund 6lbs  --She has been cutting down on 3pm snack and reducing carbs. Keep up great effort.   -- Continue Ozempic 1 mg.  --Continue weight loss goal of mid 140s.  This is within the healthiest fat range for someone her age and her sex and the period that she felt the best.    Orders:  -     Semaglutide, 1 MG/DOSE, (Ozempic, 1 MG/DOSE,) 4 MG/3ML solution pen-injector; Inject 1 mg under the skin into the appropriate area as directed 1 (One) Time Per Week for 84 days.  Dispense: 3 mL; Refill: 1    2. Controlled type 2 diabetes mellitus without complication, without long-term current use of insulin  Assessment & Plan:  Diabetes is improving with treatment.   Continue current treatment regimen.  Diabetes will be reassessed in 1 month.    Orders:  -     Semaglutide, 1 MG/DOSE, (Ozempic, 1 MG/DOSE,) 4 MG/3ML solution pen-injector; Inject 1 mg under the skin into the appropriate area as directed 1 (One) Time Per Week " for 84 days.  Dispense: 3 mL; Refill: 1        We discussed the risks, benefits, and limitations of treatments. Continue medications and OTC supplements as discussed. Patient verbalizes understanding of and agreement with management plan.       Follow Up   Return in about 4 weeks (around 1/16/2024).  I spent 25 minutes on this date of service. This time includes time spent by me in the following activities:preparing for the visit, counseling and educating the patient/family/caregiver, ordering medications, tests, or procedures and documenting information in the medical record.  Patient was given instructions and counseling regarding her condition or for health maintenance advice. Please see specific information pulled into the AVS if appropriate.     Aminah Rosario, APRN

## 2023-12-19 ENCOUNTER — TELEMEDICINE (OUTPATIENT)
Dept: BARIATRICS/WEIGHT MGMT | Facility: CLINIC | Age: 43
End: 2023-12-19
Payer: COMMERCIAL

## 2023-12-19 VITALS — WEIGHT: 150.8 LBS | HEIGHT: 64 IN | BODY MASS INDEX: 25.74 KG/M2

## 2023-12-19 DIAGNOSIS — E11.9 CONTROLLED TYPE 2 DIABETES MELLITUS WITHOUT COMPLICATION, WITHOUT LONG-TERM CURRENT USE OF INSULIN: ICD-10-CM

## 2023-12-19 DIAGNOSIS — E66.3 OVERWEIGHT (BMI 25.0-29.9): Primary | ICD-10-CM

## 2023-12-19 RX ORDER — SEMAGLUTIDE 1.34 MG/ML
1 INJECTION, SOLUTION SUBCUTANEOUS WEEKLY
Qty: 3 ML | Refills: 1 | Status: SHIPPED | OUTPATIENT
Start: 2023-12-19 | End: 2024-03-12

## 2023-12-19 NOTE — ASSESSMENT & PLAN NOTE
Patient's (Body mass index is 25.88 kg/m².) indicates that they are overweight with health conditions that include dyslipidemias . Weight is improving with treatment. BMI is is above average; BMI management plan is completed. We discussed low calorie, low carb based diet program, portion control, increasing exercise, pharmacologic options including ozempic, and an kirti-based approach such as [employer] Pal or Lose It.   --This is a follw up visit and she is down arund 6lbs  --She has been cutting down on 3pm snack and reducing carbs. Keep up great effort.   -- Continue Ozempic 1 mg.  --Continue weight loss goal of mid 140s.  This is within the healthiest fat range for someone her age and her sex and the period that she felt the best.

## 2024-02-19 DIAGNOSIS — E66.3 OVERWEIGHT (BMI 25.0-29.9): ICD-10-CM

## 2024-02-19 DIAGNOSIS — E11.9 CONTROLLED TYPE 2 DIABETES MELLITUS WITHOUT COMPLICATION, WITHOUT LONG-TERM CURRENT USE OF INSULIN: ICD-10-CM

## 2024-02-19 RX ORDER — SEMAGLUTIDE 1.34 MG/ML
1 INJECTION, SOLUTION SUBCUTANEOUS WEEKLY
Qty: 3 ML | Refills: 1 | Status: SHIPPED | OUTPATIENT
Start: 2024-02-19 | End: 2024-05-13

## 2024-02-19 NOTE — TELEPHONE ENCOUNTER
Rx Refill Note  Requested Prescriptions     Pending Prescriptions Disp Refills    Semaglutide, 1 MG/DOSE, (Ozempic, 1 MG/DOSE,) 4 MG/3ML solution pen-injector 3 mL 1     Sig: Inject 1 mg under the skin into the appropriate area as directed 1 (One) Time Per Week for 84 days.      Last office visit with prescribing clinician: 9/28/2023   Last telemedicine visit with prescribing clinician: 12/19/2023   Next office visit with prescribing clinician: 3/12/2024                         Would you like a call back once the refill request has been completed: [] Yes [] No    If the office needs to give you a call back, can they leave a voicemail: [] Yes [] No    Rebecca Ferrari MA  02/19/24, 08:59 EST

## 2024-03-10 NOTE — PROGRESS NOTES
"  [facility]  [address]  [place] [address]       --labs needed  --ozempic 1mg    Office Note      Date: 2024  Patient Name: Deanna Menon  MRN: [id number]  : 1980    Subjective     Chief Complaint  Obesity Management follow-up    Deanna Menon presents to [facility] for obesity management.  {Problem List  Visit Diagnosis   Encounters  Notes  Medications  Labs  Result Review Imaging  Media :23}   Patient is {Satisfied/unsatisfied/unsure:38702} with weight loss progress. Appetite is {poor/mod/well:87647}. Reports no side effects of prescribed medications today. The patient {is / IS NOT:92841::\"is not\"} taking multivitamin and {is / IS NOT:76453::\"is not\"} taking fish oil.  The patient {is / IS NOT:70643::\"is not\"} using a food journal.  The patient rates current efforts as *** out of 10.  The patient is exercising with a FITT score of:    Frequency Intensity Time Strength Training   []   0, none []   0 []   0 []   0   []   1 (1-2x/week) []   1 (light) []   1 (<10 min) []   1 (1x/week)   []   2 (3-5x/week) []   2 (moderate) []   2 (10-20 min) []   2 (2x/week)   []   3 (daily) []   3 (moderately hard)  []   4 (very hard) []   3 (20-30 min)  []   4 (>30 min) []   3 (3-4x/week)     Review of Systems    Objective   Start weight: *** pounds.    Total Loss lb/%Loss of beginning body weight (BBW): ***lb/***%  Change in weight since last visit: ***    Recent Weight History:   Wt Readings from Last 6 Encounters:   23 68.4 kg (150 lb 12.8 oz)   23 71.2 kg (157 lb)   23 71.7 kg (158 lb)   23 68.9 kg (152 lb)   23 67.6 kg (149 lb)   23 67.5 kg (148 lb 12.8 oz)         There is no height or weight on file to calculate BMI.   Body composition analysis completed and showed:        Measurements (in inches)       Vital Signs:   There were no vitals taken for this visit.      Physical Exam "   Result Review :{Labs  Result Review  Imaging  Med Tab  Media :23}   {The following data was reviewed by (Optional):10650}  {Ambulatory Labs (Optional):82020}           Assessment / Plan     {CC Problem List  Visit Diagnosis  ROS  Review (Popup)  Health Maintenance  Quality  BestPractice  Medications  SmartSets  SnapShot Encounters  Media :23}   There are no diagnoses linked to this encounter.    We discussed the risks, benefits, and limitations of treatments. Continue medications and OTC supplements as discussed. Patient verbalizes understanding of and agreement with management plan.     Follow Up {Instructions Charge Capture  Follow-up Communications :23}  No follow-ups on file.  Patient was given instructions and counseling regarding her condition or for health maintenance advice. Please see specific information pulled into the AVS if appropriate.     I spent *** minutes on this date of service. This time includes time spent by me in the following activities:preparing for the visit, counseling and educating the patient/family/caregiver, ordering medications, tests, or procedures and documenting information in the medical record.    Aminah Rosario, APRN  03/12/2024

## 2024-03-11 RX ORDER — SEMAGLUTIDE 1.34 MG/ML
1 INJECTION, SOLUTION SUBCUTANEOUS WEEKLY
Qty: 3 ML | Refills: 1 | Status: SHIPPED | OUTPATIENT
Start: 2024-03-11 | End: 2024-03-12 | Stop reason: SDUPTHER

## 2024-03-12 ENCOUNTER — TELEMEDICINE (OUTPATIENT)
Dept: BARIATRICS/WEIGHT MGMT | Facility: CLINIC | Age: 44
End: 2024-03-12
Payer: COMMERCIAL

## 2024-03-12 VITALS — HEIGHT: 64 IN | WEIGHT: 152 LBS | BODY MASS INDEX: 25.95 KG/M2

## 2024-03-12 DIAGNOSIS — E11.9 CONTROLLED TYPE 2 DIABETES MELLITUS WITHOUT COMPLICATION, WITHOUT LONG-TERM CURRENT USE OF INSULIN: ICD-10-CM

## 2024-03-12 DIAGNOSIS — E66.3 OVERWEIGHT (BMI 25.0-29.9): Primary | ICD-10-CM

## 2024-03-12 DIAGNOSIS — Z79.899 ENCOUNTER FOR LONG-TERM CURRENT USE OF MEDICATION: ICD-10-CM

## 2024-03-12 RX ORDER — SEMAGLUTIDE 1.34 MG/ML
1 INJECTION, SOLUTION SUBCUTANEOUS WEEKLY
Qty: 9 ML | Refills: 0 | Status: SHIPPED | OUTPATIENT
Start: 2024-03-12 | End: 2024-06-04

## 2024-03-12 RX ORDER — ONDANSETRON 8 MG/1
8 TABLET, ORALLY DISINTEGRATING ORAL EVERY 8 HOURS PRN
Qty: 30 TABLET | Refills: 1 | Status: SHIPPED | OUTPATIENT
Start: 2024-03-12 | End: 2024-04-11

## 2024-03-12 NOTE — ASSESSMENT & PLAN NOTE
Patient's (Body mass index is 26.09 kg/m².) indicates that they are overweight with health conditions that include diabetes mellitus and dyslipidemias . Weight is unchanged. BMI is is above average; BMI management plan is completed. We discussed low calorie, low carb based diet program, portion control, increasing exercise, pharmacologic options including ozempic, and an kirti-based approach such as [employer] Pal or Lose It.   --She is maintenance phase. Be aware to monitor weight regularly   --GLP-1 labs

## 2024-03-12 NOTE — PROGRESS NOTES
"     Office Note      Date: 2024  Patient Name: Deanna Menon  MRN: [id number]  : 1980    This service was conducted via [employer].  Patient is located in [place]  Provider is located at her office address. The use of a video visit has been reviewed with the patient and informed consent has been obtained.    Subjective     Chief Complaint  Obesity Management follow-up    Subjective          Deanna Menon presents to [facility] via telehealth for obesity management. She is taking 1mg ozempic with no adverse effects. Reports home weight at 152lb.     Patient is satisfied with weight loss progress. Appetite is well controlled. Reports no side effects of prescribed medications today.     The patient is exercising with a FITT score of:    Frequency Intensity Time Strength Training   []   0, none []   0 []   0 []   0   []   1 (1-2x/week) []   1 (light) []   1 (<10 min) []   1 (1x/week)   [x]   2 (3-5x/week) []   2 (moderate) []   2 (10-20 min) [x]   2 (2x/week)   []   3 (daily) []   3 (moderately hard)  [x]   4 (very hard) []   3 (20-30 min)  [x]   4 (>30 min) []   3 (3-4x/week)       Review of Systems   Constitutional:  Negative for appetite change and fatigue.   Eyes:  Negative for visual disturbance.   Cardiovascular:  Negative for chest pain and palpitations.   Gastrointestinal:  Negative for constipation and indigestion.   Neurological:  Negative for light-headedness.     Objective   Body mass index is 26.09 kg/m².  Start weight: 220.5 pounds.    Total weight loss: -68 pounds/-30%  Change in weight since last visit: +1.2    Wt Readings from Last 6 Encounters:   24 0812 68.9 kg (152 lb)   23 1104 68.4 kg (150 lb 12.8 oz)   23 0844 71.2 kg (157 lb)   23 1104 71.7 kg (158 lb)   23 0847 68.9 kg (152 lb)   23 0910 67.6 kg (149 lb)       General:  .well developed; well nourished  no acute distress  obese        Ht 162.6 cm (64\")   Wt " 68.9 kg (152 lb)   BMI 26.09 kg/m²       Result Review :     Common labs          4/18/2023    09:00 9/28/2023    11:50   Common Labs   Hemoglobin A1C 5.30  5.40                Assessment and Plan    Diagnoses and all orders for this visit:    1. Overweight (BMI 25.0-29.9) (Primary)  Assessment & Plan:  Patient's (Body mass index is 26.09 kg/m².) indicates that they are overweight with health conditions that include diabetes mellitus and dyslipidemias . Weight is unchanged. BMI is is above average; BMI management plan is completed. We discussed low calorie, low carb based diet program, portion control, increasing exercise, pharmacologic options including ozempic, and an kirti-based approach such as [employer] Pal or Lose It.   --She is maintenance phase. Be aware to monitor weight regularly   --GLP-1 labs     Orders:  -     Discontinue: Semaglutide, 1 MG/DOSE, (Ozempic, 1 MG/DOSE,) 4 MG/3ML solution pen-injector; Inject 1 mg under the skin into the appropriate area as directed 1 (One) Time Per Week for 84 days.  Dispense: 3 mL; Refill: 1  -     Cancel: Hemoglobin A1c; Future  -     Cancel: Comprehensive Metabolic Panel; Future  -     Cancel: Lipid Panel; Future  -     Hemoglobin A1c  -     Comprehensive Metabolic Panel  -     Lipid Panel  -     Semaglutide, 1 MG/DOSE, (Ozempic, 1 MG/DOSE,) 4 MG/3ML solution pen-injector; Inject 1 mg under the skin into the appropriate area as directed 1 (One) Time Per Week for 84 days.  Dispense: 9 mL; Refill: 0    2. Controlled type 2 diabetes mellitus without complication, without long-term current use of insulin  -     Discontinue: Semaglutide, 1 MG/DOSE, (Ozempic, 1 MG/DOSE,) 4 MG/3ML solution pen-injector; Inject 1 mg under the skin into the appropriate area as directed 1 (One) Time Per Week for 84 days.  Dispense: 3 mL; Refill: 1  -     Cancel: Hemoglobin A1c; Future  -     Cancel: Comprehensive Metabolic Panel; Future  -     Cancel: Lipid Panel; Future  -     Hemoglobin A1c  -      Semaglutide, 1 MG/DOSE, (Ozempic, 1 MG/DOSE,) 4 MG/3ML solution pen-injector; Inject 1 mg under the skin into the appropriate area as directed 1 (One) Time Per Week for 84 days.  Dispense: 9 mL; Refill: 0    3. Encounter for long-term current use of medication  -     Hemoglobin A1c  -     Comprehensive Metabolic Panel  -     Lipid Panel    Other orders  -     ondansetron ODT (ZOFRAN-ODT) 8 MG disintegrating tablet; Place 1 tablet on the tongue Every 8 (Eight) Hours As Needed for Nausea or Vomiting for up to 30 days.  Dispense: 30 tablet; Refill: 1    We discussed the risks, benefits, and limitations of treatments. Continue medications and OTC supplements as discussed. Patient verbalizes understanding of and agreement with management plan.     Follow Up   Return in about 3 months (around 6/12/2024).    I spent 30 minutes on this date of service. This time includes time spent by me in the following activities:preparing for the visit, counseling and educating the patient/family/caregiver, ordering medications, tests, or procedures and documenting information in the medical record.  Patient was given instructions and counseling regarding her condition or for health maintenance advice. Please see specific information pulled into the AVS if appropriate.     JHONNY Lantigua

## 2024-03-28 LAB
ALBUMIN SERPL-MCNC: 4.3 G/DL (ref 3.9–4.9)
ALBUMIN/GLOB SERPL: 2 {RATIO} (ref 1.2–2.2)
ALP SERPL-CCNC: 60 IU/L (ref 44–121)
ALT SERPL-CCNC: 19 IU/L (ref 0–32)
AST SERPL-CCNC: 19 IU/L (ref 0–40)
BILIRUB SERPL-MCNC: 0.5 MG/DL (ref 0–1.2)
BUN SERPL-MCNC: 15 MG/DL (ref 6–24)
BUN/CREAT SERPL: 21 (ref 9–23)
CALCIUM SERPL-MCNC: 8.8 MG/DL (ref 8.7–10.2)
CHLORIDE SERPL-SCNC: 103 MMOL/L (ref 96–106)
CHOLEST SERPL-MCNC: 154 MG/DL (ref 100–199)
CO2 SERPL-SCNC: 25 MMOL/L (ref 20–29)
CREAT SERPL-MCNC: 0.71 MG/DL (ref 0.57–1)
EGFRCR SERPLBLD CKD-EPI 2021: 108 ML/MIN/1.73
GLOBULIN SER CALC-MCNC: 2.2 G/DL (ref 1.5–4.5)
GLUCOSE SERPL-MCNC: 84 MG/DL (ref 70–99)
HBA1C MFR BLD: 5.4 % (ref 4.8–5.6)
HDLC SERPL-MCNC: 57 MG/DL
LDLC SERPL CALC-MCNC: 87 MG/DL (ref 0–99)
POTASSIUM SERPL-SCNC: 4.6 MMOL/L (ref 3.5–5.2)
PROT SERPL-MCNC: 6.5 G/DL (ref 6–8.5)
SODIUM SERPL-SCNC: 139 MMOL/L (ref 134–144)
TRIGL SERPL-MCNC: 43 MG/DL (ref 0–149)
VLDLC SERPL CALC-MCNC: 10 MG/DL (ref 5–40)

## 2024-06-02 PROBLEM — R73.03 PRE-DIABETES: Status: ACTIVE | Noted: 2022-02-24

## 2024-07-19 DIAGNOSIS — R73.03 PRE-DIABETES: Primary | ICD-10-CM

## 2024-07-19 DIAGNOSIS — E11.9 CONTROLLED TYPE 2 DIABETES MELLITUS WITHOUT COMPLICATION, WITHOUT LONG-TERM CURRENT USE OF INSULIN: ICD-10-CM

## 2024-07-19 DIAGNOSIS — E66.3 OVERWEIGHT (BMI 25.0-29.9): ICD-10-CM

## 2024-07-22 RX ORDER — SEMAGLUTIDE 1.34 MG/ML
1 INJECTION, SOLUTION SUBCUTANEOUS WEEKLY
Qty: 9 ML | Refills: 0 | Status: SHIPPED | OUTPATIENT
Start: 2024-07-22 | End: 2024-10-14

## 2024-07-22 RX ORDER — SEMAGLUTIDE 1.34 MG/ML
1 INJECTION, SOLUTION SUBCUTANEOUS WEEKLY
Qty: 9 ML | Refills: 0 | Status: SHIPPED | OUTPATIENT
Start: 2024-07-22 | End: 2024-07-22 | Stop reason: SDUPTHER

## 2024-08-22 NOTE — PROGRESS NOTES
[facility]  [address]  [place] [address]     Office Note      Date: 2024  Patient Name: Deanna Menon  MRN: [id number]  : 1980    Subjective     Chief Complaint  Obesity Management follow-up    Deanna Menon presents to [facility] for obesity management.       Patient is satisfied with weight loss progress. Appetite is moderately controlled. Reports no side effects of prescribed medications today. The patient is taking multivitamin and is not taking fish oil.  The patient is using a food journal.  The patient rates current efforts as 10 out of 10. She is currently taking Ozempic 1mg.     The patient is exercising with a FITT score of:    Frequency Intensity Time Strength Training   []   0, none []   0 []   0 []   0   []   1 (1-2x/week) []   1 (light) []   1 (<10 min) []   1 (1x/week)   [x]   2 (3-5x/week) []   2 (moderate) []   2 (10-20 min) []   2 (2x/week)   []   3 (daily) [x]   3 (moderately hard)  []   4 (very hard) [x]   3 (20-30 min)  [x]   4 (>30 min) [x]   3 (3-4x/week)     Review of Systems   Constitutional:  Negative for appetite change and fatigue.   Eyes:  Negative for visual disturbance.   Cardiovascular:  Negative for chest pain and palpitations.   Gastrointestinal:  Negative for constipation and indigestion.   Neurological:  Negative for light-headedness.       Objective   Start weight: 220 pounds.    Total Loss lb/%Loss of beginning body weight (BBW): -61.8 lb/-28.09 %  Change in weight since last visit: +6.2 lb    Recent Weight History:   Wt Readings from Last 6 Encounters:   24 71.8 kg (158 lb 3.2 oz)   24 68.9 kg (152 lb)   23 68.4 kg (150 lb 12.8 oz)   23 71.2 kg (157 lb)   23 71.7 kg (158 lb)   23 68.9 kg (152 lb)         Body mass index is 27.15 kg/m².   Body composition analysis completed and showed:   Body Fat %: 24.8    Measurements (in inches)  Waist  "Circumference: 32    Vital Signs:   /66 (BP Location: Left arm, Patient Position: Sitting)   Pulse 76   Ht 162.6 cm (64\")   Wt 71.8 kg (158 lb 3.2 oz)   BMI 27.15 kg/m²       Physical Exam  Vitals reviewed.   Constitutional:       Appearance: She is overweight. She is not ill-appearing.   Pulmonary:      Effort: Pulmonary effort is normal.   Neurological:      Mental Status: She is alert.   Psychiatric:         Attention and Perception: Attention and perception normal.         Behavior: Behavior is cooperative.        Result Review :     Common labs          9/28/2023    11:50 3/27/2024    07:35   Common Labs   Glucose  84    BUN  15    Creatinine  0.71    Sodium  139    Potassium  4.6    Chloride  103    Calcium  8.8    Total Protein  6.5    Albumin  4.3    Total Bilirubin  0.5    Alkaline Phosphatase  60    AST (SGOT)  19    ALT (SGPT)  19    Total Cholesterol  154    Triglycerides  43    HDL Cholesterol  57    LDL Cholesterol   87    Hemoglobin A1C 5.40  5.4               Assessment / Plan        Diagnoses and all orders for this visit:    1. Overweight (BMI 25.0-29.9) (Primary)  Assessment & Plan:  Patient's (Body mass index is 27.15 kg/m².) indicates that they are overweight with health conditions that include  pre-diabetes  . Weight is improving with treatment. BMI is above average; BMI management plan is completed. We discussed low calorie, low carb based diet program, portion control, increasing exercise, and an kirti-based approach such as [employer] Pal or Lose It.   --This is a follow up and pt is up around 6 lbs  --Labs reviewed; external labs would need provided to continue use of ozempic with diagnosis of T2DM. Diagnosis changed to pre-diabetes. Pt advised to start reduce dose to half for the next 2 month, prior to discontinuing medication.   --Prior approval started for Wegovy. Insurance through [employer]. Discussed compounded semaglutide or use of low dose phentermine or qsymia if needed in " future.       2. Pre-diabetes        We discussed the risks, benefits, and limitations of treatments. Continue medications and OTC supplements as discussed. Patient verbalizes understanding of and agreement with management plan.     Follow Up   Return in about 3 months (around 11/26/2024).  Patient was given instructions and counseling regarding her condition or for health maintenance advice. Please see specific information pulled into the AVS if appropriate.     I spent 40 minutes on this date of service. This time includes time spent by me in the following activities:preparing for the visit, counseling and educating the patient/family/caregiver, ordering medications, tests, or procedures and documenting information in the medical record.    Aminah Rosario, APRN  08/26/2024

## 2024-08-26 ENCOUNTER — OFFICE VISIT (OUTPATIENT)
Dept: BARIATRICS/WEIGHT MGMT | Facility: CLINIC | Age: 44
End: 2024-08-26
Payer: COMMERCIAL

## 2024-08-26 VITALS
BODY MASS INDEX: 27.01 KG/M2 | HEIGHT: 64 IN | DIASTOLIC BLOOD PRESSURE: 66 MMHG | SYSTOLIC BLOOD PRESSURE: 128 MMHG | WEIGHT: 158.2 LBS | HEART RATE: 76 BPM

## 2024-08-26 DIAGNOSIS — E66.3 OVERWEIGHT (BMI 25.0-29.9): Primary | ICD-10-CM

## 2024-08-26 DIAGNOSIS — R73.03 PRE-DIABETES: ICD-10-CM

## 2024-08-26 PROCEDURE — 99215 OFFICE O/P EST HI 40 MIN: CPT | Performed by: NURSE PRACTITIONER

## 2024-08-26 NOTE — ASSESSMENT & PLAN NOTE
Patient's (Body mass index is 27.15 kg/m².) indicates that they are overweight with health conditions that include  pre-diabetes  . Weight is improving with treatment. BMI is above average; BMI management plan is completed. We discussed low calorie, low carb based diet program, portion control, increasing exercise, and an kirti-based approach such as [employer] Pal or Lose It.   --This is a follow up and pt is up around 6 lbs  --Labs reviewed; external labs would need provided to continue use of ozempic with diagnosis of T2DM. Diagnosis changed to pre-diabetes. Pt advised to start reduce dose to half for the next 2 month, prior to discontinuing medication.   --Prior approval started for Wegovy. Insurance through [employer]. Discussed compounded semaglutide or use of low dose phentermine or qsymia if needed in future.

## 2024-08-27 ENCOUNTER — PRIOR AUTHORIZATION (OUTPATIENT)
Dept: BARIATRICS/WEIGHT MGMT | Facility: CLINIC | Age: 44
End: 2024-08-27
Payer: COMMERCIAL

## 2024-08-27 NOTE — TELEPHONE ENCOUNTER
Deanna Menon  Key: [id number]  Wegovy 0.25MG/0.5ML auto-injectors      Message from Plan  This request cannot be processed due to the medication is not covered by the plan

## 2024-11-25 ENCOUNTER — TELEMEDICINE (OUTPATIENT)
Dept: BARIATRICS/WEIGHT MGMT | Facility: CLINIC | Age: 44
End: 2024-11-25
Payer: COMMERCIAL

## 2024-11-25 VITALS — WEIGHT: 159 LBS | HEIGHT: 64 IN | BODY MASS INDEX: 27.14 KG/M2

## 2024-11-25 DIAGNOSIS — E66.3 OVERWEIGHT (BMI 25.0-29.9): Primary | ICD-10-CM

## 2024-11-25 DIAGNOSIS — Z79.899 ENCOUNTER FOR LONG-TERM CURRENT USE OF MEDICATION: ICD-10-CM

## 2024-11-25 PROCEDURE — 99214 OFFICE O/P EST MOD 30 MIN: CPT | Performed by: NURSE PRACTITIONER

## 2024-11-25 RX ORDER — PHENTERMINE AND TOPIRAMATE 3.75; 23 MG/1; MG/1
1 CAPSULE, EXTENDED RELEASE ORAL DAILY
Qty: 14 CAPSULE | Refills: 0 | Status: SHIPPED | OUTPATIENT
Start: 2024-11-25 | End: 2024-12-09

## 2024-11-25 RX ORDER — PHENTERMINE AND TOPIRAMATE 7.5; 46 MG/1; MG/1
1 CAPSULE, EXTENDED RELEASE ORAL DAILY
Qty: 30 CAPSULE | Refills: 0 | Status: SHIPPED | OUTPATIENT
Start: 2024-11-25 | End: 2024-12-25

## 2024-11-25 NOTE — ASSESSMENT & PLAN NOTE
Patient's (Body mass index is 27.29 kg/m².) indicates that they are overweight with health conditions that include dyslipidemias . Weight is improving with treatment. BMI is above average; BMI management plan is completed. We discussed low calorie, low carb based diet program, portion control, increasing exercise, pharmacologic options including qsymia, and an kirti-based approach such as [employer] Pal or Lose It.     -- This is a follow-up visit and patient is up around a pound since last being seen.  --Discussed adding in Qsymia.  Shamar reviewed and UDS required as it has been longer than a year since completed.  This was ordered and patient plans to get this completed at our office this week.  --Patient advised that she would need to go to [email or web address] and fill out patient registration prior to this prescription being sent out.  --We did discuss the option of using compounded semaglutide in the future if needed.

## 2024-11-25 NOTE — PROGRESS NOTES
"  [facility]  [address]  [place] [address]     Office Note      Date: 2024  Patient Name: Deanna Menon  MRN: [id number]  : 1980    --Last seen 2024. Needed external labs to continue ozempic. PA for wegovy started, no coverage    Subjective     Chief Complaint  Obesity Management follow-up    Deanna Menon presents to [facility] for obesity management.  {Problem List  Visit Diagnosis   Encounters  Notes  Medications  Labs  Result Review Imaging  Media :23}   Patient is {Satisfied/unsatisfied/unsure:34470} with weight loss progress. Appetite is {poor/mod/well:21546}. Reports no side effects of prescribed medications today. The patient {is / IS NOT:55238::\"is not\"} taking multivitamin and {is / IS NOT:58316::\"is not\"} taking fish oil.  The patient {is / IS NOT:64838::\"is not\"} using a food journal.  The patient rates current efforts as *** out of 10.  The patient is exercising with a FITT score of:    Frequency Intensity Time Strength Training   []   0, none []   0 []   0 []   0   []   1 (1-2x/week) []   1 (light) []   1 (<10 min) []   1 (1x/week)   []   2 (3-5x/week) []   2 (moderate) []   2 (10-20 min) []   2 (2x/week)   []   3 (daily) []   3 (moderately hard)  []   4 (very hard) []   3 (20-30 min)  []   4 (>30 min) []   3 (3-4x/week)     Review of Systems    Objective   Start weight: *** pounds.    Total Loss lb/%Loss of beginning body weight (BBW): ***lb/***%  Change in weight since last visit: ***    Recent Weight History:   Wt Readings from Last 6 Encounters:   24 71.8 kg (158 lb 3.2 oz)   24 68.9 kg (152 lb)   23 68.4 kg (150 lb 12.8 oz)   23 71.2 kg (157 lb)   23 71.7 kg (158 lb)   23 68.9 kg (152 lb)         There is no height or weight on file to calculate BMI.   Body composition analysis completed and showed:        Measurements (in inches)       Vital Signs: "   There were no vitals taken for this visit.      Physical Exam   Result Review :{Labs  Result Review  Imaging  Med Tab  Media :23}   {The following data was reviewed by (Optional):27866}  {Ambulatory Labs (Optional):13499}           Assessment / Plan     {CC Problem List  Visit Diagnosis  ROS  Review (Popup)  Health Maintenance  Quality  BestPractice  Medications  SmartSets  SnapShot Encounters  Media :23}   There are no diagnoses linked to this encounter.    We discussed the risks, benefits, and limitations of treatments. Continue medications and OTC supplements as discussed. Patient verbalizes understanding of and agreement with management plan.     Follow Up {Instructions Charge Capture  Follow-up Communications :23}  No follow-ups on file.  Patient was given instructions and counseling regarding her condition or for health maintenance advice. Please see specific information pulled into the AVS if appropriate.     I spent *** minutes on this date of service. This time includes time spent by me in the following activities:preparing for the visit, counseling and educating the patient/family/caregiver, ordering medications, tests, or procedures and documenting information in the medical record.    Aminah Rosario, APRN  11/25/2024

## 2024-11-25 NOTE — PROGRESS NOTES
Office Note      Date: 2024  Patient Name: Deanna Menon  MRN: [id number]  : 1980    Mode of Visit: Video  Location of patient: -WORK-  Location of provider: +[facility]+  You have chosen to receive care through a telehealth visit.  The patient has signed the video visit consent form.  The visit included audio and video interaction. No technical issues occurred during this visit.    Subjective     Chief Complaint  Obesity Management follow-up    Subjective          Deanna Menon presents to [facility] via telehealth for obesity management.  Reports that she is still taking leftover doses of Ozempic.  At last visit it was discussed that refills of this could no longer be covered without external lab reports showing confirmed type 2 diabetes.  He has reduced dose 2.5 and has noticed a significant appetite increase.  She has been working hard to maintain her calorie levels.  She does have desire to look into alternative medications as she transitions off semaglutide.      Patient is satisfied with weight loss progress. Appetite is poorly controlled. Reports no side effects of prescribed medications today.   The patient is exercising with a FITT score of:    Frequency Intensity Time Strength Training   []   0, none []   0 []   0 []   0   []   1 (1-2x/week) []   1 (light) []   1 (<10 min) []   1 (1x/week)   [x]   2 (3-5x/week) []   2 (moderate) []   2 (10-20 min) -27   2 (2x/week)   []   3 (daily) [x]   3 (moderately hard)  []   4 (very hard) []   3 (20-30 min)  [x]   4 (>30 min) []   3 (3-4x/week)       Review of Systems   Constitutional:  Negative for appetite change and fatigue.   Eyes:  Negative for visual disturbance.   Cardiovascular:  Negative for chest pain and palpitations.   Gastrointestinal:  Negative for constipation and indigestion.   Neurological:  Negative for light-headedness.       Objective   Body mass index is 27.29 kg/m².  Start weight: 220  "pounds.    Total weight loss: -61 pounds/-27%  Change in weight since last visit: +1    Wt Readings from Last 6 Encounters:   11/25/24 0830 72.1 kg (159 lb)   08/26/24 1442 71.8 kg (158 lb 3.2 oz)   03/12/24 0812 68.9 kg (152 lb)   12/19/23 1104 68.4 kg (150 lb 12.8 oz)   11/09/23 0844 71.2 kg (157 lb)   09/28/23 1104 71.7 kg (158 lb)       General:  .well developed; well nourished  no acute distress  obese      Ht 162.6 cm (64\")   Wt 72.1 kg (159 lb)   BMI 27.29 kg/m²       Result Review :     Common labs          3/27/2024    07:35   Common Labs   Glucose 84    BUN 15    Creatinine 0.71    Sodium 139    Potassium 4.6    Chloride 103    Calcium 8.8    Total Protein 6.5    Albumin 4.3    Total Bilirubin 0.5    Alkaline Phosphatase 60    AST (SGOT) 19    ALT (SGPT) 19    Total Cholesterol 154    Triglycerides 43    HDL Cholesterol 57    LDL Cholesterol  87    Hemoglobin A1C 5.4                Assessment and Plan    Diagnoses and all orders for this visit:    1. Overweight (BMI 25.0-29.9) (Primary)  Assessment & Plan:  Patient's (Body mass index is 27.29 kg/m².) indicates that they are overweight with health conditions that include dyslipidemias . Weight is improving with treatment. BMI is above average; BMI management plan is completed. We discussed low calorie, low carb based diet program, portion control, increasing exercise, pharmacologic options including qsymia, and an kirti-based approach such as [employer] Pal or Lose It.     -- This is a follow-up visit and patient is up around a pound since last being seen.  --Discussed adding in Qsymia.  Shamar reviewed and UDS required as it has been longer than a year since completed.  This was ordered and patient plans to get this completed at our office this week.  --Patient advised that she would need to go to [employer] engage and fill out patient registration prior to this prescription being sent out.  --We did discuss the option of using compounded semaglutide in the " future if needed.    Orders:  -     Urine Drug Screen - Urine, Clean Catch  -     Qsymia 3.75-23 MG capsule sustained-release 24 hr; Take 1 capsule by mouth Daily for 14 days.  Dispense: 14 capsule; Refill: 0  -     Qsymia 7.5-46 MG capsule sustained-release 24 hr; Take 1 capsule by mouth Daily for 30 days.  Dispense: 30 capsule; Refill: 0    2. Encounter for long-term current use of medication  -     Urine Drug Screen - Urine, Clean Catch      We discussed the risks, benefits, and limitations of treatments. Continue medications and OTC supplements as discussed. Patient verbalizes understanding of and agreement with management plan.       Follow Up   Return in about 4 weeks (around 12/23/2024).  I spent 30 minutes on this date of service. This time includes time spent by me in the following activities:preparing for the visit, counseling and educating the patient/family/caregiver, ordering medications, tests, or procedures and documenting information in the medical record.  Patient was given instructions and counseling regarding her condition or for health maintenance advice. Please see specific information pulled into the AVS if appropriate.     Aminah Rosario, JHONNY

## 2024-11-29 LAB
AMPHETAMINES UR QL SCN: NEGATIVE NG/ML
BARBITURATES UR QL SCN: NEGATIVE NG/ML
BENZODIAZ UR QL SCN: NEGATIVE NG/ML
BZE UR QL SCN: NEGATIVE NG/ML
CANNABINOIDS UR QL SCN: NEGATIVE NG/ML
CREAT UR-MCNC: 50.6 MG/DL (ref 20–300)
LABORATORY COMMENT REPORT: NORMAL
METHADONE UR QL SCN: NEGATIVE NG/ML
OPIATES UR QL SCN: NEGATIVE NG/ML
OXYCODONE+OXYMORPHONE UR QL SCN: NEGATIVE NG/ML
PCP UR QL: NEGATIVE NG/ML
PH UR: 5.3 [PH] (ref 4.5–8.9)
PROPOXYPH UR QL SCN: NEGATIVE NG/ML

## 2024-12-27 ENCOUNTER — TELEPHONE (OUTPATIENT)
Dept: BARIATRICS/WEIGHT MGMT | Facility: CLINIC | Age: 44
End: 2024-12-27
Payer: COMMERCIAL

## 2025-01-02 DIAGNOSIS — E66.3 OVERWEIGHT (BMI 25.0-29.9): Primary | ICD-10-CM

## 2025-01-02 RX ORDER — PHENTERMINE AND TOPIRAMATE 7.5; 46 MG/1; MG/1
1 CAPSULE, EXTENDED RELEASE ORAL DAILY
Qty: 30 CAPSULE | Refills: 0 | Status: SHIPPED | OUTPATIENT
Start: 2025-01-02 | End: 2025-02-01

## 2025-01-28 NOTE — PROGRESS NOTES
"  [facility]  [address]  [place] [address]     Office Note      Date: 2025  Patient Name: Deanna Menon  MRN: [id number]  : 1980    --maintenance phase;  started Qsymia; used to be on ozempic (pre-diabetes)     Subjective     Chief Complaint  Obesity Management follow-up    Deanna Menon presents to [facility] for obesity management.  {Problem List  Visit Diagnosis   Encounters  Notes  Medications  Labs  Result Review Imaging  Media :23}   Patient is {Satisfied/unsatisfied/unsure:86066} with weight loss progress. Appetite is {poor/mod/well:94675}. Reports no side effects of prescribed medications today. The patient {is / IS NOT:03058::\"is not\"} taking multivitamin and {is / IS NOT:57223::\"is not\"} taking fish oil.  The patient {is / IS NOT:54187::\"is not\"} using a food journal.  The patient rates current efforts as *** out of 10.  The patient is exercising with a FITT score of:    Frequency Intensity Time Strength Training   []   0, none []   0 []   0 []   0   []   1 (1-2x/week) []   1 (light) []   1 (<10 min) []   1 (1x/week)   []   2 (3-5x/week) []   2 (moderate) []   2 (10-20 min) []   2 (2x/week)   []   3 (daily) []   3 (moderately hard)  []   4 (very hard) []   3 (20-30 min)  []   4 (>30 min) []   3 (3-4x/week)     Review of Systems    Objective   Start weight: *** pounds.    Total Loss lb/%Loss of beginning body weight (BBW): ***lb/***%  Change in weight since last visit: ***    Recent Weight History:   Wt Readings from Last 6 Encounters:   24 72.1 kg (159 lb)   24 71.8 kg (158 lb 3.2 oz)   24 68.9 kg (152 lb)   23 68.4 kg (150 lb 12.8 oz)   23 71.2 kg (157 lb)   23 71.7 kg (158 lb)         There is no height or weight on file to calculate BMI.   Body composition analysis completed and showed:        Measurements (in inches)       Vital Signs:   There were no vitals " taken for this visit.      Physical Exam   Result Review :{Labs  Result Review  Imaging  Med Tab  Media :23}   {The following data was reviewed by (Optional):04268}  {Ambulatory Labs (Optional):46179}           Assessment / Plan     {CC Problem List  Visit Diagnosis  ROS  Review (Popup)  Health Maintenance  Quality  BestPractice  Medications  SmartSets  SnapShot Encounters  Media :23}   There are no diagnoses linked to this encounter.    We discussed the risks, benefits, and limitations of treatments. Continue medications and OTC supplements as discussed. Patient verbalizes understanding of and agreement with management plan.     Follow Up {Instructions Charge Capture  Follow-up Communications :23}  No follow-ups on file.  Patient was given instructions and counseling regarding her condition or for health maintenance advice. Please see specific information pulled into the AVS if appropriate.     I spent *** minutes on this date of service. This time includes time spent by me in the following activities:preparing for the visit, counseling and educating the patient/family/caregiver, ordering medications, tests, or procedures and documenting information in the medical record.    Aminah Rosario, APRN  02/11/2025

## 2025-02-03 DIAGNOSIS — E66.3 OVERWEIGHT (BMI 25.0-29.9): ICD-10-CM

## 2025-02-05 RX ORDER — PHENTERMINE AND TOPIRAMATE 7.5; 46 MG/1; MG/1
1 CAPSULE, EXTENDED RELEASE ORAL DAILY
Qty: 30 CAPSULE | Refills: 0 | Status: SHIPPED | OUTPATIENT
Start: 2025-02-05 | End: 2025-03-07

## 2025-02-11 ENCOUNTER — TELEMEDICINE (OUTPATIENT)
Dept: BARIATRICS/WEIGHT MGMT | Facility: CLINIC | Age: 45
End: 2025-02-11
Payer: COMMERCIAL

## 2025-02-11 VITALS — WEIGHT: 146 LBS | BODY MASS INDEX: 24.92 KG/M2 | HEIGHT: 64 IN

## 2025-02-11 DIAGNOSIS — E66.3 OVERWEIGHT (BMI 25.0-29.9): Primary | ICD-10-CM

## 2025-02-11 RX ORDER — PHENTERMINE AND TOPIRAMATE 7.5; 46 MG/1; MG/1
1 CAPSULE, EXTENDED RELEASE ORAL DAILY
Qty: 30 CAPSULE | Refills: 0 | Status: SHIPPED | OUTPATIENT
Start: 2025-02-11 | End: 2025-03-13

## 2025-02-11 NOTE — PROGRESS NOTES
Office Note      Date: 2025  Patient Name: Deanna Menon  MRN: [id number]  : 1980    Mode of Visit: Video  Location of patient: -HOME-  Location of provider: +[facility]  You have chosen to receive care through a telehealth visit.  The patient has signed the video visit consent form.  The visit included audio and video interaction. No technical issues occurred during this visit.    Subjective     Chief Complaint  Obesity Management follow-up    Subjective          Deanna Menon presents to [facility] via telehealth for obesity management. She is taking Qsymia daily. Denies any side effects. It has curbed appetite. Reports her home weight is 146lbs      Patient is satisfied with weight loss progress. Appetite is well controlled. Reports no side effects of prescribed medications today.   The patient is exercising with a FITT score of:    Frequency Intensity Time Strength Training   []   0, none []   0 []   0 []   0   []   1 (1-2x/week) []   1 (light) []   1 (<10 min) []   1 (1x/week)   [x]   2 (3-5x/week) []   2 (moderate) []   2 (10-20 min) []   2 (2x/week)   []   3 (daily) [x]   3 (moderately hard)  []   4 (very hard) []   3 (20-30 min)  [x]   4 (>30 min) [x]   3 (3-4x/week)       Review of Systems   Constitutional:  Negative for appetite change and fatigue.   Eyes:  Negative for visual disturbance.   Cardiovascular:  Negative for chest pain and palpitations.   Gastrointestinal:  Negative for constipation and indigestion.   Neurological:  Negative for light-headedness.       Objective   Body mass index is 25.06 kg/m².  Start weight: 220 pounds.    Total weight loss: -74 pounds/-33%  Change in weight since last visit: -13    Wt Readings from Last 6 Encounters:   25 0807 66.2 kg (146 lb)   24 0830 72.1 kg (159 lb)   24 1442 71.8 kg (158 lb 3.2 oz)   24 0812 68.9 kg (152 lb)   23 1104 68.4 kg (150 lb 12.8 oz)   23 0844 71.2  "kg (157 lb)     General:  .well developed; well nourished  no acute distress  obese        Ht 162.6 cm (64\")   Wt 66.2 kg (146 lb)   BMI 25.06 kg/m²       Result Review :     Common labs          3/27/2024    07:35   Common Labs   Glucose 84    BUN 15    Creatinine 0.71    Sodium 139    Potassium 4.6    Chloride 103    Calcium 8.8    Total Protein 6.5    Albumin 4.3    Total Bilirubin 0.5    Alkaline Phosphatase 60    AST (SGOT) 19    ALT (SGPT) 19    Total Cholesterol 154    Triglycerides 43    HDL Cholesterol 57    LDL Cholesterol  87    Hemoglobin A1C 5.4                Assessment and Plan    Diagnoses and all orders for this visit:    1. Overweight (BMI 25.0-29.9) (Primary)  Assessment & Plan:  Patient's (Body mass index is 25.06 kg/m².) indicates that they are overweight with health conditions that include dyslipidemias . Weight is improving with treatment. BMI is above average; BMI management plan is completed. We discussed low calorie, low carb based diet program, portion control, increasing exercise, pharmacologic options including qsymia, and an kirti-based approach such as [employer] Pal or Lose It.   -- Continue Qsymia, refill sent in.  Discussed that if BMI drops below 25 or fat percentage below 30% we would need to discontinue this medication.  Patient states understanding.  --Continues to excellent job of food choices and exercise.  Keep up this great effort.  --Discussed that we would have the option of using compounding semaglutide in future if needed for weight maintenance.    Orders:  -     Qsymia 7.5-46 MG capsule sustained-release 24 hr; Take 1 capsule by mouth Daily for 30 days.  Dispense: 30 capsule; Refill: 0      We discussed the risks, benefits, and limitations of treatments. Continue medications and OTC supplements as discussed. Patient verbalizes understanding of and agreement with management plan.       Follow Up   Return in about 4 weeks (around 3/11/2025).  I spent 30 minutes on this " date of service. This time includes time spent by me in the following activities:preparing for the visit, counseling and educating the patient/family/caregiver, ordering medications, tests, or procedures and documenting information in the medical record.  Patient was given instructions and counseling regarding her condition or for health maintenance advice. Please see specific information pulled into the AVS if appropriate.     Aminah Rosario, APRN

## 2025-02-11 NOTE — ASSESSMENT & PLAN NOTE
Patient's (Body mass index is 25.06 kg/m².) indicates that they are overweight with health conditions that include dyslipidemias . Weight is improving with treatment. BMI is above average; BMI management plan is completed. We discussed low calorie, low carb based diet program, portion control, increasing exercise, pharmacologic options including qsymia, and an kirti-based approach such as [employer] Pal or Lose It.   -- Continue Qsymia, refill sent in.  Discussed that if BMI drops below 25 or fat percentage below 30% we would need to discontinue this medication.  Patient states understanding.  --Continues to excellent job of food choices and exercise.  Keep up this great effort.  --Discussed that we would have the option of using compounding semaglutide in future if needed for weight maintenance.

## 2025-03-01 DIAGNOSIS — E66.3 OVERWEIGHT (BMI 25.0-29.9): ICD-10-CM

## 2025-03-01 RX ORDER — PHENTERMINE AND TOPIRAMATE 7.5; 46 MG/1; MG/1
1 CAPSULE, EXTENDED RELEASE ORAL DAILY
Qty: 30 CAPSULE | Refills: 0 | Status: CANCELLED | OUTPATIENT
Start: 2025-03-01 | End: 2025-03-31

## 2025-03-03 DIAGNOSIS — E66.3 OVERWEIGHT (BMI 25.0-29.9): ICD-10-CM

## 2025-03-03 RX ORDER — PHENTERMINE AND TOPIRAMATE 7.5; 46 MG/1; MG/1
1 CAPSULE, EXTENDED RELEASE ORAL DAILY
Qty: 30 CAPSULE | Refills: 0 | Status: SHIPPED | OUTPATIENT
Start: 2025-03-03 | End: 2025-04-02

## 2025-03-15 DIAGNOSIS — E66.3 OVERWEIGHT (BMI 25.0-29.9): ICD-10-CM

## 2025-03-15 DIAGNOSIS — E11.9 CONTROLLED TYPE 2 DIABETES MELLITUS WITHOUT COMPLICATION, WITHOUT LONG-TERM CURRENT USE OF INSULIN: ICD-10-CM

## 2025-03-17 RX ORDER — SEMAGLUTIDE 1.34 MG/ML
1 INJECTION, SOLUTION SUBCUTANEOUS WEEKLY
Qty: 3 ML | Refills: 1 | OUTPATIENT
Start: 2025-03-17

## 2025-03-17 NOTE — TELEPHONE ENCOUNTER
Rx Refill Note  Requested Prescriptions     Pending Prescriptions Disp Refills    Ozempic, 1 MG/DOSE, 4 MG/3ML solution pen-injector [Pharmacy Med Name: OZEMPIC 1 MG/DOSE (4 MG/3 ML)] 3 mL 1     Sig: DIAL AND INJECT UNDER THE SKIN 1 MG WEEKLY      Last office visit with prescribing clinician: 8/26/2024   Last telemedicine visit with prescribing clinician: 2/11/2025   Next office visit with prescribing clinician: Visit date not found                         Would you like a call back once the refill request has been completed: [] Yes [] No    If the office needs to give you a call back, can they leave a voicemail: [] Yes [] No    Rebecca Ferrari MA  03/17/25, 07:48 EDT

## 2025-04-01 DIAGNOSIS — E66.3 OVERWEIGHT (BMI 25.0-29.9): ICD-10-CM

## 2025-04-02 NOTE — TELEPHONE ENCOUNTER
Rx Refill Note  Requested Prescriptions     Pending Prescriptions Disp Refills    Qsymia 7.5-46 MG capsule sustained-release 24 hr 30 capsule 0     Sig: Take 1 capsule by mouth Daily for 30 days.      Last office visit with prescribing clinician: 8/26/2024   Last telemedicine visit with prescribing clinician: 2/11/2025   Next office visit with prescribing clinician: Visit date not found                         Would you like a call back once the refill request has been completed: [] Yes [] No    If the office needs to give you a call back, can they leave a voicemail: [] Yes [] No    Rebecca Ferrari MA  04/02/25, 08:35 EDT

## 2025-04-06 RX ORDER — PHENTERMINE AND TOPIRAMATE 7.5; 46 MG/1; MG/1
1 CAPSULE, EXTENDED RELEASE ORAL DAILY
Qty: 30 CAPSULE | Refills: 0 | Status: SHIPPED | OUTPATIENT
Start: 2025-04-06 | End: 2025-05-06

## 2025-05-03 DIAGNOSIS — E66.3 OVERWEIGHT (BMI 25.0-29.9): ICD-10-CM

## 2025-05-05 RX ORDER — PHENTERMINE AND TOPIRAMATE 7.5; 46 MG/1; MG/1
1 CAPSULE, EXTENDED RELEASE ORAL DAILY
Qty: 30 CAPSULE | Refills: 0 | OUTPATIENT
Start: 2025-05-05 | End: 2025-06-04

## 2025-05-06 ENCOUNTER — OFFICE VISIT (OUTPATIENT)
Dept: BARIATRICS/WEIGHT MGMT | Facility: CLINIC | Age: 45
End: 2025-05-06
Payer: COMMERCIAL

## 2025-05-06 VITALS
BODY MASS INDEX: 24.48 KG/M2 | DIASTOLIC BLOOD PRESSURE: 80 MMHG | SYSTOLIC BLOOD PRESSURE: 110 MMHG | HEIGHT: 64 IN | HEART RATE: 64 BPM | OXYGEN SATURATION: 98 % | RESPIRATION RATE: 18 BRPM | WEIGHT: 143.4 LBS

## 2025-05-06 DIAGNOSIS — E66.3 OVERWEIGHT (BMI 25.0-29.9): Primary | ICD-10-CM

## 2025-05-06 PROCEDURE — 99214 OFFICE O/P EST MOD 30 MIN: CPT | Performed by: NURSE PRACTITIONER

## 2025-05-06 NOTE — PROGRESS NOTES
[facility]  [address]  [place] [address]     Office Note      Date: 2025  Patient Name: Deanna Menon  MRN: [id number]  : 1980    Subjective     Chief Complaint  Obesity Management follow-up    Deanna Menon presents to [facility] for obesity management.     Patient is satisfied with weight loss progress. Appetite is moderately controlled. Reports no side effects of prescribed medications today. The patient is taking multivitamin and is not taking fish oil.  The patient is using a food journal.  The patient rates current efforts as 8 out of 10. She is currently taking qsymia 7.5mg    The patient is exercising with a FITT score of:    Frequency Intensity Time Strength Training   []   0, none []   0 []   0 []   0   []   1 (1-2x/week) []   1 (light) []   1 (<10 min) []   1 (1x/week)   []   2 (3-5x/week) []   2 (moderate) []   2 (10-20 min) []   2 (2x/week)   [x]   3 (daily) [x]   3 (moderately hard)  []   4 (very hard) []   3 (20-30 min)  [x]   4 (>30 min) [x]   3 (3-4x/week)     Review of Systems   Constitutional:  Negative for appetite change and fatigue.   Eyes:  Negative for visual disturbance.   Cardiovascular:  Negative for chest pain and palpitations.   Gastrointestinal:  Negative for constipation and indigestion.   Neurological:  Negative for light-headedness.     Objective   Start weight: 220 pounds.    Total Loss lb/%Loss of beginning body weight (BBW): -76.6 lb/-34.82%  Change in weight since last visit: -2.6    Recent Weight History:   Wt Readings from Last 6 Encounters:   25 65 kg (143 lb 6.4 oz)   25 66.2 kg (146 lb)   24 72.1 kg (159 lb)   24 71.8 kg (158 lb 3.2 oz)   24 68.9 kg (152 lb)   23 68.4 kg (150 lb 12.8 oz)     Body mass index is 24.61 kg/m².   Body composition analysis completed and showed:   Body Fat %: 28.1%    Measurements (in inches)  Waist Circumference:  "28.5    Vital Signs:   /80   Pulse 64   Resp 18   Ht 162.6 cm (64\")   Wt 65 kg (143 lb 6.4 oz)   SpO2 98%   BMI 24.61 kg/m²       Physical Exam  Vitals reviewed.   Constitutional:       Appearance: She is well-developed.      Comments: overweight   Pulmonary:      Effort: Pulmonary effort is normal.   Neurological:      Mental Status: She is alert.   Psychiatric:         Attention and Perception: Attention normal.         Mood and Affect: Mood normal.         Speech: Speech normal.         Behavior: Behavior normal.        Result Review :           Assessment / Plan        Diagnoses and all orders for this visit:    1. Overweight (BMI 25.0-29.9) (Primary)  Assessment & Plan:  Patient's (Body mass index is 24.61 kg/m².) indicates that they are overweight with health conditions that include dyslipidemias . Weight is improving with treatment. BMI is above average; BMI management plan is completed. We discussed low calorie, low carb based diet program, portion control, increasing exercise, pharmacologic options including qsymia, and an kirti-based approach such as [employer] Pal or Lose It.   --This is a follow up visit and she is down 2.6 lbs  --Taper Qsymia. Start taking every other day or PRN. Shamar and UDS reviewed.   --Body comp reviewed. She is within a healthy fat range        We discussed the risks, benefits, and limitations of treatments. Continue medications and OTC supplements as discussed. Patient verbalizes understanding of and agreement with management plan.     Follow Up   Return in about 3 months (around 8/6/2025).  Patient was given instructions and counseling regarding her condition or for health maintenance advice. Please see specific information pulled into the AVS if appropriate.     I spent 30 minutes on this date of service. This time includes time spent by me in the following activities:preparing for the visit, counseling and educating the patient/family/caregiver, ordering medications, " tests, or procedures and documenting information in the medical record.    Aminah Rosario, APRN  05/06/2025

## 2025-05-07 DIAGNOSIS — E66.3 OVERWEIGHT (BMI 25.0-29.9): ICD-10-CM

## 2025-05-08 RX ORDER — PHENTERMINE AND TOPIRAMATE 7.5; 46 MG/1; MG/1
1 CAPSULE, EXTENDED RELEASE ORAL DAILY
Qty: 30 CAPSULE | Refills: 0 | Status: SHIPPED | OUTPATIENT
Start: 2025-05-08 | End: 2025-06-07

## 2025-05-20 DIAGNOSIS — T88.7XXA SIDE EFFECT OF DRUG: Primary | ICD-10-CM

## 2025-05-22 RX ORDER — ONDANSETRON 8 MG/1
TABLET, ORALLY DISINTEGRATING ORAL
Qty: 30 TABLET | Refills: 1 | Status: SHIPPED | OUTPATIENT
Start: 2025-05-22

## 2025-06-02 DIAGNOSIS — E66.3 OVERWEIGHT (BMI 25.0-29.9): ICD-10-CM

## 2025-06-09 RX ORDER — PHENTERMINE AND TOPIRAMATE 7.5; 46 MG/1; MG/1
1 CAPSULE, EXTENDED RELEASE ORAL DAILY
Qty: 30 CAPSULE | Refills: 0 | Status: SHIPPED | OUTPATIENT
Start: 2025-06-09 | End: 2025-07-09

## 2025-07-02 DIAGNOSIS — E66.3 OVERWEIGHT (BMI 25.0-29.9): ICD-10-CM

## 2025-07-03 RX ORDER — PHENTERMINE AND TOPIRAMATE 7.5; 46 MG/1; MG/1
1 CAPSULE, EXTENDED RELEASE ORAL DAILY
Qty: 30 CAPSULE | Refills: 0 | Status: SHIPPED | OUTPATIENT
Start: 2025-07-03 | End: 2025-08-02

## 2025-08-05 ENCOUNTER — TELEMEDICINE (OUTPATIENT)
Dept: BARIATRICS/WEIGHT MGMT | Facility: CLINIC | Age: 45
End: 2025-08-05
Payer: COMMERCIAL

## 2025-08-05 VITALS — HEIGHT: 64 IN | BODY MASS INDEX: 25.61 KG/M2 | WEIGHT: 150 LBS

## 2025-08-05 DIAGNOSIS — E66.3 OVERWEIGHT (BMI 25.0-29.9): Primary | ICD-10-CM

## 2025-08-19 ENCOUNTER — PATIENT MESSAGE (OUTPATIENT)
Dept: BARIATRICS/WEIGHT MGMT | Facility: CLINIC | Age: 45
End: 2025-08-19
Payer: COMMERCIAL

## 2025-08-19 DIAGNOSIS — E66.3 OVERWEIGHT (BMI 25.0-29.9): ICD-10-CM
